# Patient Record
Sex: MALE | Race: WHITE | Employment: OTHER | URBAN - METROPOLITAN AREA
[De-identification: names, ages, dates, MRNs, and addresses within clinical notes are randomized per-mention and may not be internally consistent; named-entity substitution may affect disease eponyms.]

---

## 2019-01-01 ENCOUNTER — HOSPITAL ENCOUNTER (OUTPATIENT)
Age: 84
Discharge: HOME | End: 2019-10-10
Payer: MEDICARE

## 2019-01-01 LAB — PROSTATE SPECIFIC ANTIGEN: 0.03 NG/ML (ref 0–4)

## 2019-01-01 PROCEDURE — 36415 COLL VENOUS BLD VENIPUNCTURE: CPT

## 2019-01-01 PROCEDURE — 84153 ASSAY OF PSA TOTAL: CPT

## 2020-01-01 ENCOUNTER — TELEPHONE (OUTPATIENT)
Dept: SURGERY | Age: 85
End: 2020-01-01

## 2020-01-01 ENCOUNTER — APPOINTMENT (OUTPATIENT)
Dept: GENERAL RADIOLOGY | Age: 85
DRG: 023 | End: 2020-01-01
Payer: MEDICARE

## 2020-01-01 ENCOUNTER — OFFICE VISIT (OUTPATIENT)
Dept: SURGERY | Age: 85
End: 2020-01-01
Payer: MEDICARE

## 2020-01-01 ENCOUNTER — HOSPITAL ENCOUNTER (INPATIENT)
Age: 85
LOS: 7 days | Discharge: NURSING, REHAB OR LONG-TERM CARE | DRG: 023 | End: 2020-06-03
Attending: EMERGENCY MEDICINE | Admitting: INTERNAL MEDICINE
Payer: MEDICARE

## 2020-01-01 ENCOUNTER — APPOINTMENT (OUTPATIENT)
Dept: CT IMAGING | Age: 85
DRG: 023 | End: 2020-01-01
Payer: MEDICARE

## 2020-01-01 ENCOUNTER — APPOINTMENT (OUTPATIENT)
Dept: INTERVENTIONAL RADIOLOGY/VASCULAR | Age: 85
DRG: 023 | End: 2020-01-01
Payer: MEDICARE

## 2020-01-01 ENCOUNTER — ANESTHESIA (OUTPATIENT)
Dept: INTERVENTIONAL RADIOLOGY/VASCULAR | Age: 85
DRG: 023 | End: 2020-01-01
Payer: MEDICARE

## 2020-01-01 ENCOUNTER — ANESTHESIA EVENT (OUTPATIENT)
Dept: INTERVENTIONAL RADIOLOGY/VASCULAR | Age: 85
DRG: 023 | End: 2020-01-01
Payer: MEDICARE

## 2020-01-01 VITALS
OXYGEN SATURATION: 96 % | HEART RATE: 87 BPM | DIASTOLIC BLOOD PRESSURE: 78 MMHG | RESPIRATION RATE: 24 BRPM | BODY MASS INDEX: 28.34 KG/M2 | WEIGHT: 187 LBS | SYSTOLIC BLOOD PRESSURE: 133 MMHG | HEIGHT: 68 IN | TEMPERATURE: 96.9 F

## 2020-01-01 VITALS — SYSTOLIC BLOOD PRESSURE: 142 MMHG | OXYGEN SATURATION: 100 % | DIASTOLIC BLOOD PRESSURE: 71 MMHG

## 2020-01-01 VITALS
BODY MASS INDEX: 29.29 KG/M2 | OXYGEN SATURATION: 95 % | SYSTOLIC BLOOD PRESSURE: 116 MMHG | HEIGHT: 67 IN | HEART RATE: 80 BPM | RESPIRATION RATE: 18 BRPM | DIASTOLIC BLOOD PRESSURE: 72 MMHG | TEMPERATURE: 98.1 F

## 2020-01-01 LAB
ALBUMIN SERPL-MCNC: 2.9 G/DL (ref 3.5–5.2)
ALBUMIN SERPL-MCNC: 4.2 G/DL (ref 3.5–5.2)
ALP BLD-CCNC: 68 U/L (ref 40–129)
ALP BLD-CCNC: 70 U/L (ref 40–129)
ALT SERPL-CCNC: 10 U/L (ref 0–40)
ALT SERPL-CCNC: 31 U/L (ref 0–40)
ANION GAP SERPL CALCULATED.3IONS-SCNC: 10 MMOL/L (ref 7–16)
ANION GAP SERPL CALCULATED.3IONS-SCNC: 13 MMOL/L (ref 7–16)
ANION GAP SERPL CALCULATED.3IONS-SCNC: 14 MMOL/L (ref 7–16)
ANION GAP SERPL CALCULATED.3IONS-SCNC: 16 MMOL/L (ref 7–16)
ANION GAP SERPL CALCULATED.3IONS-SCNC: 16 MMOL/L (ref 7–16)
APTT: 25.9 SEC (ref 24.5–35.1)
AST SERPL-CCNC: 16 U/L (ref 0–39)
AST SERPL-CCNC: 34 U/L (ref 0–39)
BASOPHILS ABSOLUTE: 0.04 E9/L (ref 0–0.2)
BASOPHILS ABSOLUTE: 0.04 E9/L (ref 0–0.2)
BASOPHILS RELATIVE PERCENT: 0.4 % (ref 0–2)
BASOPHILS RELATIVE PERCENT: 0.7 % (ref 0–2)
BILIRUB SERPL-MCNC: 0.6 MG/DL (ref 0–1.2)
BILIRUB SERPL-MCNC: 0.6 MG/DL (ref 0–1.2)
BUN BLDV-MCNC: 20 MG/DL (ref 8–23)
BUN BLDV-MCNC: 23 MG/DL (ref 8–23)
BUN BLDV-MCNC: 25 MG/DL (ref 8–23)
BUN BLDV-MCNC: 26 MG/DL (ref 8–23)
BUN BLDV-MCNC: 31 MG/DL (ref 8–23)
CALCIUM SERPL-MCNC: 7.8 MG/DL (ref 8.6–10.2)
CALCIUM SERPL-MCNC: 8.4 MG/DL (ref 8.6–10.2)
CALCIUM SERPL-MCNC: 8.5 MG/DL (ref 8.6–10.2)
CALCIUM SERPL-MCNC: 8.6 MG/DL (ref 8.6–10.2)
CALCIUM SERPL-MCNC: 8.9 MG/DL (ref 8.6–10.2)
CHLORIDE BLD-SCNC: 103 MMOL/L (ref 98–107)
CHLORIDE BLD-SCNC: 105 MMOL/L (ref 98–107)
CHLORIDE BLD-SCNC: 105 MMOL/L (ref 98–107)
CHLORIDE BLD-SCNC: 108 MMOL/L (ref 98–107)
CHLORIDE BLD-SCNC: 109 MMOL/L (ref 98–107)
CHOLESTEROL, TOTAL: 103 MG/DL (ref 0–199)
CO2: 19 MMOL/L (ref 22–29)
CO2: 21 MMOL/L (ref 22–29)
CO2: 22 MMOL/L (ref 22–29)
CO2: 22 MMOL/L (ref 22–29)
CO2: 25 MMOL/L (ref 22–29)
CREAT SERPL-MCNC: 0.8 MG/DL (ref 0.7–1.2)
CREAT SERPL-MCNC: 1 MG/DL (ref 0.7–1.2)
CREAT SERPL-MCNC: 1.1 MG/DL (ref 0.7–1.2)
CREAT SERPL-MCNC: 1.2 MG/DL (ref 0.7–1.2)
CREAT SERPL-MCNC: 1.3 MG/DL (ref 0.7–1.2)
EKG ATRIAL RATE: 46 BPM
EKG P AXIS: 92 DEGREES
EKG P-R INTERVAL: 208 MS
EKG Q-T INTERVAL: 524 MS
EKG QRS DURATION: 88 MS
EKG QTC CALCULATION (BAZETT): 458 MS
EKG R AXIS: -30 DEGREES
EKG T AXIS: -9 DEGREES
EKG VENTRICULAR RATE: 46 BPM
EOSINOPHILS ABSOLUTE: 0.06 E9/L (ref 0.05–0.5)
EOSINOPHILS ABSOLUTE: 0.11 E9/L (ref 0.05–0.5)
EOSINOPHILS RELATIVE PERCENT: 1.1 % (ref 0–6)
EOSINOPHILS RELATIVE PERCENT: 1.1 % (ref 0–6)
GFR AFRICAN AMERICAN: >60
GFR NON-AFRICAN AMERICAN: 52 ML/MIN/1.73
GFR NON-AFRICAN AMERICAN: 57 ML/MIN/1.73
GFR NON-AFRICAN AMERICAN: >60 ML/MIN/1.73
GLUCOSE BLD-MCNC: 111 MG/DL (ref 74–99)
GLUCOSE BLD-MCNC: 112 MG/DL (ref 74–99)
GLUCOSE BLD-MCNC: 112 MG/DL (ref 74–99)
GLUCOSE BLD-MCNC: 125 MG/DL (ref 74–99)
GLUCOSE BLD-MCNC: 154 MG/DL (ref 74–99)
HBA1C MFR BLD: 5.7 % (ref 4–5.6)
HCT VFR BLD CALC: 25.4 % (ref 37–54)
HCT VFR BLD CALC: 25.6 % (ref 37–54)
HCT VFR BLD CALC: 25.7 % (ref 37–54)
HCT VFR BLD CALC: 26.4 % (ref 37–54)
HCT VFR BLD CALC: 28.1 % (ref 37–54)
HCT VFR BLD CALC: 35.3 % (ref 37–54)
HDLC SERPL-MCNC: 52 MG/DL
HEMOGLOBIN: 11.5 G/DL (ref 12.5–16.5)
HEMOGLOBIN: 8.2 G/DL (ref 12.5–16.5)
HEMOGLOBIN: 8.3 G/DL (ref 12.5–16.5)
HEMOGLOBIN: 8.4 G/DL (ref 12.5–16.5)
HEMOGLOBIN: 8.4 G/DL (ref 12.5–16.5)
HEMOGLOBIN: 9.3 G/DL (ref 12.5–16.5)
IMMATURE GRANULOCYTES #: 0.04 E9/L
IMMATURE GRANULOCYTES #: 0.13 E9/L
IMMATURE GRANULOCYTES %: 0.7 % (ref 0–5)
IMMATURE GRANULOCYTES %: 1.3 % (ref 0–5)
INR BLD: 1.2
LDL CHOLESTEROL CALCULATED: 37 MG/DL (ref 0–99)
LYMPHOCYTES ABSOLUTE: 0.68 E9/L (ref 1.5–4)
LYMPHOCYTES ABSOLUTE: 1 E9/L (ref 1.5–4)
LYMPHOCYTES RELATIVE PERCENT: 17.9 % (ref 20–42)
LYMPHOCYTES RELATIVE PERCENT: 6.9 % (ref 20–42)
MAGNESIUM: 2.2 MG/DL (ref 1.6–2.6)
MCH RBC QN AUTO: 29.7 PG (ref 26–35)
MCH RBC QN AUTO: 29.8 PG (ref 26–35)
MCH RBC QN AUTO: 30 PG (ref 26–35)
MCH RBC QN AUTO: 30 PG (ref 26–35)
MCH RBC QN AUTO: 30.1 PG (ref 26–35)
MCH RBC QN AUTO: 30.3 PG (ref 26–35)
MCHC RBC AUTO-ENTMCNC: 31.8 % (ref 32–34.5)
MCHC RBC AUTO-ENTMCNC: 32 % (ref 32–34.5)
MCHC RBC AUTO-ENTMCNC: 32.6 % (ref 32–34.5)
MCHC RBC AUTO-ENTMCNC: 32.7 % (ref 32–34.5)
MCHC RBC AUTO-ENTMCNC: 32.7 % (ref 32–34.5)
MCHC RBC AUTO-ENTMCNC: 33.1 % (ref 32–34.5)
MCV RBC AUTO: 90.6 FL (ref 80–99.9)
MCV RBC AUTO: 91.2 FL (ref 80–99.9)
MCV RBC AUTO: 91.7 FL (ref 80–99.9)
MCV RBC AUTO: 92.8 FL (ref 80–99.9)
MCV RBC AUTO: 93.1 FL (ref 80–99.9)
MCV RBC AUTO: 94.6 FL (ref 80–99.9)
METER GLUCOSE: 114 MG/DL (ref 74–99)
MONOCYTES ABSOLUTE: 0.55 E9/L (ref 0.1–0.95)
MONOCYTES ABSOLUTE: 0.93 E9/L (ref 0.1–0.95)
MONOCYTES RELATIVE PERCENT: 9.4 % (ref 2–12)
MONOCYTES RELATIVE PERCENT: 9.8 % (ref 2–12)
MRSA CULTURE ONLY: NORMAL
NEUTROPHILS ABSOLUTE: 3.91 E9/L (ref 1.8–7.3)
NEUTROPHILS ABSOLUTE: 8 E9/L (ref 1.8–7.3)
NEUTROPHILS RELATIVE PERCENT: 69.8 % (ref 43–80)
NEUTROPHILS RELATIVE PERCENT: 80.9 % (ref 43–80)
PDW BLD-RTO: 13.3 FL (ref 11.5–15)
PDW BLD-RTO: 13.4 FL (ref 11.5–15)
PDW BLD-RTO: 13.4 FL (ref 11.5–15)
PDW BLD-RTO: 13.5 FL (ref 11.5–15)
PDW BLD-RTO: 13.6 FL (ref 11.5–15)
PDW BLD-RTO: 13.7 FL (ref 11.5–15)
PHOSPHORUS: 3.3 MG/DL (ref 2.5–4.5)
PLATELET # BLD: 129 E9/L (ref 130–450)
PLATELET # BLD: 137 E9/L (ref 130–450)
PLATELET # BLD: 139 E9/L (ref 130–450)
PLATELET # BLD: 182 E9/L (ref 130–450)
PLATELET # BLD: 187 E9/L (ref 130–450)
PLATELET # BLD: 202 E9/L (ref 130–450)
PMV BLD AUTO: 9.3 FL (ref 7–12)
PMV BLD AUTO: 9.4 FL (ref 7–12)
PMV BLD AUTO: 9.7 FL (ref 7–12)
PMV BLD AUTO: 9.8 FL (ref 7–12)
POTASSIUM SERPL-SCNC: 3.7 MMOL/L (ref 3.5–5)
POTASSIUM SERPL-SCNC: 3.8 MMOL/L (ref 3.5–5)
POTASSIUM SERPL-SCNC: 4 MMOL/L (ref 3.5–5)
POTASSIUM SERPL-SCNC: 4.1 MMOL/L (ref 3.5–5)
POTASSIUM SERPL-SCNC: 4.5 MMOL/L (ref 3.5–5)
PROTHROMBIN TIME: 13.4 SEC (ref 9.3–12.4)
RBC # BLD: 2.75 E12/L (ref 3.8–5.8)
RBC # BLD: 2.77 E12/L (ref 3.8–5.8)
RBC # BLD: 2.77 E12/L (ref 3.8–5.8)
RBC # BLD: 2.79 E12/L (ref 3.8–5.8)
RBC # BLD: 3.1 E12/L (ref 3.8–5.8)
RBC # BLD: 3.87 E12/L (ref 3.8–5.8)
SARS-COV-2, NAAT: ABNORMAL
SARS-COV-2, NAAT: NOT DETECTED
SODIUM BLD-SCNC: 140 MMOL/L (ref 132–146)
SODIUM BLD-SCNC: 140 MMOL/L (ref 132–146)
SODIUM BLD-SCNC: 141 MMOL/L (ref 132–146)
SODIUM BLD-SCNC: 141 MMOL/L (ref 132–146)
SODIUM BLD-SCNC: 146 MMOL/L (ref 132–146)
TOTAL PROTEIN: 5.4 G/DL (ref 6.4–8.3)
TOTAL PROTEIN: 6.8 G/DL (ref 6.4–8.3)
TRIGL SERPL-MCNC: 69 MG/DL (ref 0–149)
TROPONIN: <0.01 NG/ML (ref 0–0.03)
TSH SERPL DL<=0.05 MIU/L-ACNC: 4.87 UIU/ML (ref 0.27–4.2)
VLDLC SERPL CALC-MCNC: 14 MG/DL
WBC # BLD: 10.2 E9/L (ref 4.5–11.5)
WBC # BLD: 10.8 E9/L (ref 4.5–11.5)
WBC # BLD: 12.2 E9/L (ref 4.5–11.5)
WBC # BLD: 5.6 E9/L (ref 4.5–11.5)
WBC # BLD: 8.6 E9/L (ref 4.5–11.5)
WBC # BLD: 9.9 E9/L (ref 4.5–11.5)

## 2020-01-01 PROCEDURE — 6370000000 HC RX 637 (ALT 250 FOR IP): Performed by: NURSE PRACTITIONER

## 2020-01-01 PROCEDURE — 36415 COLL VENOUS BLD VENIPUNCTURE: CPT

## 2020-01-01 PROCEDURE — 85025 COMPLETE CBC W/AUTO DIFF WBC: CPT

## 2020-01-01 PROCEDURE — 99291 CRITICAL CARE FIRST HOUR: CPT | Performed by: SURGERY

## 2020-01-01 PROCEDURE — 97112 NEUROMUSCULAR REEDUCATION: CPT

## 2020-01-01 PROCEDURE — 70450 CT HEAD/BRAIN W/O DYE: CPT

## 2020-01-01 PROCEDURE — 2060000000 HC ICU INTERMEDIATE R&B

## 2020-01-01 PROCEDURE — U0002 COVID-19 LAB TEST NON-CDC: HCPCS

## 2020-01-01 PROCEDURE — 2580000003 HC RX 258: Performed by: NURSE PRACTITIONER

## 2020-01-01 PROCEDURE — APPSS60 APP SPLIT SHARED TIME 46-60 MINUTES: Performed by: NURSE PRACTITIONER

## 2020-01-01 PROCEDURE — 61645 PERQ ART M-THROMBECT &/NFS: CPT

## 2020-01-01 PROCEDURE — 83735 ASSAY OF MAGNESIUM: CPT

## 2020-01-01 PROCEDURE — 6360000004 HC RX CONTRAST MEDICATION: Performed by: RADIOLOGY

## 2020-01-01 PROCEDURE — 74018 RADEX ABDOMEN 1 VIEW: CPT

## 2020-01-01 PROCEDURE — 3E03317 INTRODUCTION OF OTHER THROMBOLYTIC INTO PERIPHERAL VEIN, PERCUTANEOUS APPROACH: ICD-10-PCS | Performed by: EMERGENCY MEDICINE

## 2020-01-01 PROCEDURE — 80061 LIPID PANEL: CPT

## 2020-01-01 PROCEDURE — 99231 SBSQ HOSP IP/OBS SF/LOW 25: CPT | Performed by: INTERNAL MEDICINE

## 2020-01-01 PROCEDURE — 3700000001 HC ADD 15 MINUTES (ANESTHESIA)

## 2020-01-01 PROCEDURE — APPSS180 APP SPLIT SHARED TIME > 60 MINUTES: Performed by: NURSE PRACTITIONER

## 2020-01-01 PROCEDURE — 6370000000 HC RX 637 (ALT 250 FOR IP): Performed by: STUDENT IN AN ORGANIZED HEALTH CARE EDUCATION/TRAINING PROGRAM

## 2020-01-01 PROCEDURE — 6370000000 HC RX 637 (ALT 250 FOR IP): Performed by: INTERNAL MEDICINE

## 2020-01-01 PROCEDURE — 80053 COMPREHEN METABOLIC PANEL: CPT

## 2020-01-01 PROCEDURE — 85027 COMPLETE CBC AUTOMATED: CPT

## 2020-01-01 PROCEDURE — 99291 CRITICAL CARE FIRST HOUR: CPT

## 2020-01-01 PROCEDURE — 2580000003 HC RX 258: Performed by: NURSE ANESTHETIST, CERTIFIED REGISTERED

## 2020-01-01 PROCEDURE — 2000000000 HC ICU R&B

## 2020-01-01 PROCEDURE — 2500000003 HC RX 250 WO HCPCS: Performed by: SURGERY

## 2020-01-01 PROCEDURE — 93308 TTE F-UP OR LMTD: CPT

## 2020-01-01 PROCEDURE — 84100 ASSAY OF PHOSPHORUS: CPT

## 2020-01-01 PROCEDURE — 6360000002 HC RX W HCPCS: Performed by: EMERGENCY MEDICINE

## 2020-01-01 PROCEDURE — 5A1935Z RESPIRATORY VENTILATION, LESS THAN 24 CONSECUTIVE HOURS: ICD-10-PCS | Performed by: EMERGENCY MEDICINE

## 2020-01-01 PROCEDURE — 2700000000 HC OXYGEN THERAPY PER DAY

## 2020-01-01 PROCEDURE — 85610 PROTHROMBIN TIME: CPT

## 2020-01-01 PROCEDURE — 2500000003 HC RX 250 WO HCPCS: Performed by: NURSE ANESTHETIST, CERTIFIED REGISTERED

## 2020-01-01 PROCEDURE — 97530 THERAPEUTIC ACTIVITIES: CPT

## 2020-01-01 PROCEDURE — 85730 THROMBOPLASTIN TIME PARTIAL: CPT

## 2020-01-01 PROCEDURE — 80048 BASIC METABOLIC PNL TOTAL CA: CPT

## 2020-01-01 PROCEDURE — 83036 HEMOGLOBIN GLYCOSYLATED A1C: CPT

## 2020-01-01 PROCEDURE — 51702 INSERT TEMP BLADDER CATH: CPT

## 2020-01-01 PROCEDURE — 82962 GLUCOSE BLOOD TEST: CPT

## 2020-01-01 PROCEDURE — 03HY32Z INSERTION OF MONITORING DEVICE INTO UPPER ARTERY, PERCUTANEOUS APPROACH: ICD-10-PCS | Performed by: NURSE ANESTHETIST, CERTIFIED REGISTERED

## 2020-01-01 PROCEDURE — C1769 GUIDE WIRE: HCPCS

## 2020-01-01 PROCEDURE — 2580000003 HC RX 258: Performed by: SURGERY

## 2020-01-01 PROCEDURE — 97530 THERAPEUTIC ACTIVITIES: CPT | Performed by: PHYSICAL THERAPIST

## 2020-01-01 PROCEDURE — 84484 ASSAY OF TROPONIN QUANT: CPT

## 2020-01-01 PROCEDURE — 6370000000 HC RX 637 (ALT 250 FOR IP): Performed by: SURGERY

## 2020-01-01 PROCEDURE — 99233 SBSQ HOSP IP/OBS HIGH 50: CPT | Performed by: SURGERY

## 2020-01-01 PROCEDURE — 97166 OT EVAL MOD COMPLEX 45 MIN: CPT

## 2020-01-01 PROCEDURE — 97110 THERAPEUTIC EXERCISES: CPT

## 2020-01-01 PROCEDURE — 97110 THERAPEUTIC EXERCISES: CPT | Performed by: PHYSICAL THERAPIST

## 2020-01-01 PROCEDURE — 03CG3ZZ EXTIRPATION OF MATTER FROM INTRACRANIAL ARTERY, PERCUTANEOUS APPROACH: ICD-10-PCS | Performed by: RADIOLOGY

## 2020-01-01 PROCEDURE — 97162 PT EVAL MOD COMPLEX 30 MIN: CPT

## 2020-01-01 PROCEDURE — 93005 ELECTROCARDIOGRAM TRACING: CPT | Performed by: SURGERY

## 2020-01-01 PROCEDURE — 6370000000 HC RX 637 (ALT 250 FOR IP): Performed by: CLINICAL NURSE SPECIALIST

## 2020-01-01 PROCEDURE — 0042T CT BRAIN PERFUSION: CPT

## 2020-01-01 PROCEDURE — 2580000003 HC RX 258: Performed by: EMERGENCY MEDICINE

## 2020-01-01 PROCEDURE — 94002 VENT MGMT INPAT INIT DAY: CPT

## 2020-01-01 PROCEDURE — B30R1ZZ PLAIN RADIOGRAPHY OF INTRACRANIAL ARTERIES USING LOW OSMOLAR CONTRAST: ICD-10-PCS | Performed by: RADIOLOGY

## 2020-01-01 PROCEDURE — 70498 CT ANGIOGRAPHY NECK: CPT

## 2020-01-01 PROCEDURE — 87081 CULTURE SCREEN ONLY: CPT

## 2020-01-01 PROCEDURE — 93010 ELECTROCARDIOGRAM REPORT: CPT | Performed by: INTERNAL MEDICINE

## 2020-01-01 PROCEDURE — 70496 CT ANGIOGRAPHY HEAD: CPT

## 2020-01-01 PROCEDURE — 3700000000 HC ANESTHESIA ATTENDED CARE

## 2020-01-01 PROCEDURE — 36620 INSERTION CATHETER ARTERY: CPT

## 2020-01-01 PROCEDURE — 97129 THER IVNTJ 1ST 15 MIN: CPT

## 2020-01-01 PROCEDURE — 92523 SPEECH SOUND LANG COMPREHEN: CPT

## 2020-01-01 PROCEDURE — 6360000002 HC RX W HCPCS: Performed by: SURGERY

## 2020-01-01 PROCEDURE — 6360000002 HC RX W HCPCS: Performed by: NURSE ANESTHETIST, CERTIFIED REGISTERED

## 2020-01-01 PROCEDURE — 99213 OFFICE O/P EST LOW 20 MIN: CPT | Performed by: SURGERY

## 2020-01-01 PROCEDURE — 99232 SBSQ HOSP IP/OBS MODERATE 35: CPT | Performed by: CLINICAL NURSE SPECIALIST

## 2020-01-01 PROCEDURE — 99233 SBSQ HOSP IP/OBS HIGH 50: CPT | Performed by: CLINICAL NURSE SPECIALIST

## 2020-01-01 PROCEDURE — 99291 CRITICAL CARE FIRST HOUR: CPT | Performed by: PSYCHIATRY & NEUROLOGY

## 2020-01-01 PROCEDURE — 84443 ASSAY THYROID STIM HORMONE: CPT

## 2020-01-01 PROCEDURE — 51798 US URINE CAPACITY MEASURE: CPT

## 2020-01-01 PROCEDURE — 6360000002 HC RX W HCPCS: Performed by: RADIOLOGY

## 2020-01-01 PROCEDURE — APPSS60 APP SPLIT SHARED TIME 46-60 MINUTES: Performed by: PHYSICIAN ASSISTANT

## 2020-01-01 PROCEDURE — 97535 SELF CARE MNGMENT TRAINING: CPT

## 2020-01-01 PROCEDURE — 6360000002 HC RX W HCPCS

## 2020-01-01 PROCEDURE — 99223 1ST HOSP IP/OBS HIGH 75: CPT | Performed by: INTERNAL MEDICINE

## 2020-01-01 PROCEDURE — 71045 X-RAY EXAM CHEST 1 VIEW: CPT

## 2020-01-01 RX ORDER — ATENOLOL 50 MG/1
100 TABLET ORAL DAILY
Status: DISCONTINUED | OUTPATIENT
Start: 2020-01-01 | End: 2020-01-01

## 2020-01-01 RX ORDER — POLYETHYLENE GLYCOL 3350 17 G/17G
17 POWDER, FOR SOLUTION ORAL DAILY PRN
Status: DISCONTINUED | OUTPATIENT
Start: 2020-01-01 | End: 2020-01-01 | Stop reason: HOSPADM

## 2020-01-01 RX ORDER — MORPHINE SULFATE 2 MG/ML
1 INJECTION, SOLUTION INTRAMUSCULAR; INTRAVENOUS EVERY 5 MIN PRN
Status: DISCONTINUED | OUTPATIENT
Start: 2020-01-01 | End: 2020-01-01

## 2020-01-01 RX ORDER — SUCCINYLCHOLINE CHLORIDE 20 MG/ML
INJECTION INTRAMUSCULAR; INTRAVENOUS PRN
Status: DISCONTINUED | OUTPATIENT
Start: 2020-01-01 | End: 2020-01-01 | Stop reason: SDUPTHER

## 2020-01-01 RX ORDER — HEPARIN SODIUM 10000 [USP'U]/ML
INJECTION, SOLUTION INTRAVENOUS; SUBCUTANEOUS
Status: COMPLETED | OUTPATIENT
Start: 2020-01-01 | End: 2020-01-01

## 2020-01-01 RX ORDER — LABETALOL HYDROCHLORIDE 5 MG/ML
10 INJECTION, SOLUTION INTRAVENOUS EVERY 30 MIN PRN
Status: DISCONTINUED | OUTPATIENT
Start: 2020-01-01 | End: 2020-01-01

## 2020-01-01 RX ORDER — SODIUM CHLORIDE 0.9 % (FLUSH) 0.9 %
10 SYRINGE (ML) INJECTION PRN
Status: DISCONTINUED | OUTPATIENT
Start: 2020-01-01 | End: 2020-01-01 | Stop reason: HOSPADM

## 2020-01-01 RX ORDER — ATORVASTATIN CALCIUM 80 MG/1
80 TABLET, FILM COATED ORAL NIGHTLY
Qty: 30 TABLET | Refills: 3 | Status: SHIPPED | OUTPATIENT
Start: 2020-01-01

## 2020-01-01 RX ORDER — ASPIRIN 81 MG/1
81 TABLET, CHEWABLE ORAL DAILY
Status: DISCONTINUED | OUTPATIENT
Start: 2020-01-01 | End: 2020-01-01 | Stop reason: SDUPTHER

## 2020-01-01 RX ORDER — ASPIRIN 81 MG/1
81 TABLET ORAL DAILY
Status: DISCONTINUED | OUTPATIENT
Start: 2020-01-01 | End: 2020-01-01

## 2020-01-01 RX ORDER — SODIUM CHLORIDE, SODIUM LACTATE, POTASSIUM CHLORIDE, CALCIUM CHLORIDE 600; 310; 30; 20 MG/100ML; MG/100ML; MG/100ML; MG/100ML
INJECTION, SOLUTION INTRAVENOUS CONTINUOUS PRN
Status: DISCONTINUED | OUTPATIENT
Start: 2020-01-01 | End: 2020-01-01 | Stop reason: SDUPTHER

## 2020-01-01 RX ORDER — ONDANSETRON 2 MG/ML
4 INJECTION INTRAMUSCULAR; INTRAVENOUS
Status: ACTIVE | OUTPATIENT
Start: 2020-01-01 | End: 2020-01-01

## 2020-01-01 RX ORDER — ACETAMINOPHEN 325 MG/1
650 TABLET ORAL EVERY 4 HOURS PRN
Status: DISCONTINUED | OUTPATIENT
Start: 2020-01-01 | End: 2020-01-01 | Stop reason: HOSPADM

## 2020-01-01 RX ORDER — SODIUM CHLORIDE 9 MG/ML
INJECTION, SOLUTION INTRAVENOUS CONTINUOUS
Status: DISCONTINUED | OUTPATIENT
Start: 2020-01-01 | End: 2020-01-01 | Stop reason: HOSPADM

## 2020-01-01 RX ORDER — SODIUM CHLORIDE 0.9 % (FLUSH) 0.9 %
10 SYRINGE (ML) INJECTION PRN
Status: DISCONTINUED | OUTPATIENT
Start: 2020-01-01 | End: 2020-01-01

## 2020-01-01 RX ORDER — NIFEDIPINE 30 MG/1
30 TABLET, EXTENDED RELEASE ORAL DAILY
Status: DISCONTINUED | OUTPATIENT
Start: 2020-01-01 | End: 2020-01-01 | Stop reason: HOSPADM

## 2020-01-01 RX ORDER — GLYCOPYRROLATE 1 MG/5 ML
SYRINGE (ML) INTRAVENOUS PRN
Status: DISCONTINUED | OUTPATIENT
Start: 2020-01-01 | End: 2020-01-01 | Stop reason: SDUPTHER

## 2020-01-01 RX ORDER — CALCIUM CHLORIDE 100 MG/ML
INJECTION INTRAVENOUS; INTRAVENTRICULAR PRN
Status: DISCONTINUED | OUTPATIENT
Start: 2020-01-01 | End: 2020-01-01 | Stop reason: SDUPTHER

## 2020-01-01 RX ORDER — ASPIRIN 300 MG/1
300 SUPPOSITORY RECTAL DAILY
Status: DISCONTINUED | OUTPATIENT
Start: 2020-01-01 | End: 2020-01-01

## 2020-01-01 RX ORDER — MORPHINE SULFATE 2 MG/ML
2 INJECTION, SOLUTION INTRAMUSCULAR; INTRAVENOUS EVERY 5 MIN PRN
Status: DISCONTINUED | OUTPATIENT
Start: 2020-01-01 | End: 2020-01-01

## 2020-01-01 RX ORDER — AMLODIPINE BESYLATE 2.5 MG/1
2.5 TABLET ORAL DAILY
COMMUNITY

## 2020-01-01 RX ORDER — PROMETHAZINE HYDROCHLORIDE 25 MG/1
12.5 TABLET ORAL EVERY 6 HOURS PRN
Status: DISCONTINUED | OUTPATIENT
Start: 2020-01-01 | End: 2020-01-01

## 2020-01-01 RX ORDER — ETOMIDATE 2 MG/ML
INJECTION INTRAVENOUS PRN
Status: DISCONTINUED | OUTPATIENT
Start: 2020-01-01 | End: 2020-01-01 | Stop reason: SDUPTHER

## 2020-01-01 RX ORDER — DEXTROSE MONOHYDRATE 25 G/50ML
12.5 INJECTION, SOLUTION INTRAVENOUS
Status: ACTIVE | OUTPATIENT
Start: 2020-01-01 | End: 2020-01-01

## 2020-01-01 RX ORDER — ASPIRIN 81 MG/1
81 TABLET ORAL DAILY
COMMUNITY

## 2020-01-01 RX ORDER — ATORVASTATIN CALCIUM 80 MG/1
80 TABLET, FILM COATED ORAL NIGHTLY
Status: DISCONTINUED | OUTPATIENT
Start: 2020-01-01 | End: 2020-01-01 | Stop reason: HOSPADM

## 2020-01-01 RX ORDER — ONDANSETRON 2 MG/ML
4 INJECTION INTRAMUSCULAR; INTRAVENOUS EVERY 6 HOURS PRN
Status: DISCONTINUED | OUTPATIENT
Start: 2020-01-01 | End: 2020-01-01 | Stop reason: HOSPADM

## 2020-01-01 RX ORDER — CHLORTHALIDONE 25 MG/1
25 TABLET ORAL DAILY
Status: DISCONTINUED | OUTPATIENT
Start: 2020-01-01 | End: 2020-01-01 | Stop reason: HOSPADM

## 2020-01-01 RX ORDER — 0.9 % SODIUM CHLORIDE 0.9 %
50 INTRAVENOUS SOLUTION INTRAVENOUS ONCE
Status: DISCONTINUED | OUTPATIENT
Start: 2020-01-01 | End: 2020-01-01

## 2020-01-01 RX ORDER — ATENOLOL AND CHLORTHALIDONE TABLET 100; 25 MG/1; MG/1
1 TABLET ORAL DAILY
Status: DISCONTINUED | OUTPATIENT
Start: 2020-01-01 | End: 2020-01-01 | Stop reason: SDUPTHER

## 2020-01-01 RX ORDER — ATENOLOL AND CHLORTHALIDONE TABLET 100; 25 MG/1; MG/1
1 TABLET ORAL DAILY
COMMUNITY

## 2020-01-01 RX ORDER — HYDRALAZINE HYDROCHLORIDE 20 MG/ML
10 INJECTION INTRAMUSCULAR; INTRAVENOUS EVERY 4 HOURS PRN
Status: CANCELLED | OUTPATIENT
Start: 2020-01-01

## 2020-01-01 RX ORDER — DOXAZOSIN MESYLATE 4 MG/1
8 TABLET ORAL DAILY
Status: DISCONTINUED | OUTPATIENT
Start: 2020-01-01 | End: 2020-01-01 | Stop reason: HOSPADM

## 2020-01-01 RX ORDER — LABETALOL HYDROCHLORIDE 5 MG/ML
10 INJECTION, SOLUTION INTRAVENOUS EVERY 4 HOURS PRN
Status: CANCELLED | OUTPATIENT
Start: 2020-01-01

## 2020-01-01 RX ORDER — LEVOFLOXACIN 500 MG/1
500 TABLET, FILM COATED ORAL DAILY
COMMUNITY

## 2020-01-01 RX ORDER — VECURONIUM BROMIDE 1 MG/ML
INJECTION, POWDER, LYOPHILIZED, FOR SOLUTION INTRAVENOUS PRN
Status: DISCONTINUED | OUTPATIENT
Start: 2020-01-01 | End: 2020-01-01 | Stop reason: SDUPTHER

## 2020-01-01 RX ORDER — FENTANYL CITRATE 50 UG/ML
INJECTION, SOLUTION INTRAMUSCULAR; INTRAVENOUS PRN
Status: DISCONTINUED | OUTPATIENT
Start: 2020-01-01 | End: 2020-01-01 | Stop reason: SDUPTHER

## 2020-01-01 RX ORDER — SODIUM CHLORIDE 0.9 % (FLUSH) 0.9 %
10 SYRINGE (ML) INJECTION EVERY 12 HOURS SCHEDULED
Status: DISCONTINUED | OUTPATIENT
Start: 2020-01-01 | End: 2020-01-01

## 2020-01-01 RX ORDER — SODIUM CHLORIDE 0.9 % (FLUSH) 0.9 %
10 SYRINGE (ML) INJECTION ONCE
Status: DISCONTINUED | OUTPATIENT
Start: 2020-01-01 | End: 2020-01-01

## 2020-01-01 RX ORDER — TERAZOSIN 10 MG/1
10 CAPSULE ORAL NIGHTLY
COMMUNITY

## 2020-01-01 RX ORDER — ASPIRIN 81 MG/1
81 TABLET, CHEWABLE ORAL DAILY
Status: DISCONTINUED | OUTPATIENT
Start: 2020-01-01 | End: 2020-01-01

## 2020-01-01 RX ORDER — ONDANSETRON 2 MG/ML
4 INJECTION INTRAMUSCULAR; INTRAVENOUS EVERY 6 HOURS PRN
Status: DISCONTINUED | OUTPATIENT
Start: 2020-01-01 | End: 2020-01-01

## 2020-01-01 RX ORDER — GABAPENTIN 300 MG/1
300 CAPSULE ORAL DAILY
Status: DISCONTINUED | OUTPATIENT
Start: 2020-01-01 | End: 2020-01-01 | Stop reason: HOSPADM

## 2020-01-01 RX ORDER — SODIUM CHLORIDE 0.9 % (FLUSH) 0.9 %
10 SYRINGE (ML) INJECTION EVERY 12 HOURS SCHEDULED
Status: DISCONTINUED | OUTPATIENT
Start: 2020-01-01 | End: 2020-01-01 | Stop reason: HOSPADM

## 2020-01-01 RX ORDER — HYDRALAZINE HYDROCHLORIDE 20 MG/ML
10 INJECTION INTRAMUSCULAR; INTRAVENOUS EVERY 30 MIN PRN
Status: DISCONTINUED | OUTPATIENT
Start: 2020-01-01 | End: 2020-01-01

## 2020-01-01 RX ORDER — PROPOFOL 10 MG/ML
INJECTION, EMULSION INTRAVENOUS CONTINUOUS PRN
Status: DISCONTINUED | OUTPATIENT
Start: 2020-01-01 | End: 2020-01-01 | Stop reason: SDUPTHER

## 2020-01-01 RX ORDER — LEVOTHYROXINE SODIUM 0.05 MG/1
50 TABLET ORAL DAILY
Status: DISCONTINUED | OUTPATIENT
Start: 2020-01-01 | End: 2020-01-01 | Stop reason: HOSPADM

## 2020-01-01 RX ORDER — SENNA AND DOCUSATE SODIUM 50; 8.6 MG/1; MG/1
2 TABLET, FILM COATED ORAL DAILY PRN
Status: DISCONTINUED | OUTPATIENT
Start: 2020-01-01 | End: 2020-01-01 | Stop reason: HOSPADM

## 2020-01-01 RX ADMIN — FAMOTIDINE 20 MG: 10 INJECTION INTRAVENOUS at 09:00

## 2020-01-01 RX ADMIN — ASPIRIN 81 MG 81 MG: 81 TABLET ORAL at 08:48

## 2020-01-01 RX ADMIN — SODIUM CHLORIDE: 9 INJECTION, SOLUTION INTRAVENOUS at 20:15

## 2020-01-01 RX ADMIN — ATORVASTATIN CALCIUM 80 MG: 80 TABLET, FILM COATED ORAL at 20:15

## 2020-01-01 RX ADMIN — SODIUM CHLORIDE, PRESERVATIVE FREE 10 ML: 5 INJECTION INTRAVENOUS at 22:54

## 2020-01-01 RX ADMIN — ATORVASTATIN CALCIUM 80 MG: 80 TABLET, FILM COATED ORAL at 21:42

## 2020-01-01 RX ADMIN — ACETAMINOPHEN 650 MG: 325 TABLET ORAL at 17:45

## 2020-01-01 RX ADMIN — CALCIUM CHLORIDE 1 G: 100 INJECTION INTRAVENOUS; INTRAVENTRICULAR at 16:46

## 2020-01-01 RX ADMIN — VECURONIUM BROMIDE FOR INJECTION 6 MG: 1 INJECTION, POWDER, LYOPHILIZED, FOR SOLUTION INTRAVENOUS at 16:11

## 2020-01-01 RX ADMIN — METOPROLOL TARTRATE 25 MG: 25 TABLET, FILM COATED ORAL at 20:25

## 2020-01-01 RX ADMIN — Medication 5000 UNITS: at 18:17

## 2020-01-01 RX ADMIN — Medication 0.2 MG: at 16:30

## 2020-01-01 RX ADMIN — LEVOTHYROXINE SODIUM 50 MCG: 0.05 TABLET ORAL at 12:13

## 2020-01-01 RX ADMIN — LEVOTHYROXINE SODIUM 50 MCG: 0.05 TABLET ORAL at 06:14

## 2020-01-01 RX ADMIN — SODIUM CHLORIDE, PRESERVATIVE FREE 10 ML: 5 INJECTION INTRAVENOUS at 21:05

## 2020-01-01 RX ADMIN — ACETAMINOPHEN 650 MG: 325 TABLET ORAL at 12:57

## 2020-01-01 RX ADMIN — APIXABAN 5 MG: 5 TABLET, FILM COATED ORAL at 22:54

## 2020-01-01 RX ADMIN — NEOSTIGMINE METHYLSULFATE 3 MG: 0.5 INJECTION INTRAMUSCULAR; INTRAVENOUS; SUBCUTANEOUS at 18:45

## 2020-01-01 RX ADMIN — ALTEPLASE 73.5 MG: KIT at 15:04

## 2020-01-01 RX ADMIN — ASPIRIN 81 MG 81 MG: 81 TABLET ORAL at 08:37

## 2020-01-01 RX ADMIN — ACETAMINOPHEN 650 MG: 325 TABLET ORAL at 00:53

## 2020-01-01 RX ADMIN — VECURONIUM BROMIDE FOR INJECTION 1 MG: 1 INJECTION, POWDER, LYOPHILIZED, FOR SOLUTION INTRAVENOUS at 17:36

## 2020-01-01 RX ADMIN — ACETAMINOPHEN 650 MG: 325 TABLET ORAL at 23:03

## 2020-01-01 RX ADMIN — METOPROLOL TARTRATE 25 MG: 25 TABLET, FILM COATED ORAL at 21:05

## 2020-01-01 RX ADMIN — GABAPENTIN 300 MG: 300 CAPSULE ORAL at 08:37

## 2020-01-01 RX ADMIN — SODIUM CHLORIDE, PRESERVATIVE FREE 10 ML: 5 INJECTION INTRAVENOUS at 20:23

## 2020-01-01 RX ADMIN — ACETAMINOPHEN 650 MG: 325 TABLET ORAL at 05:56

## 2020-01-01 RX ADMIN — VECURONIUM BROMIDE FOR INJECTION 2 MG: 1 INJECTION, POWDER, LYOPHILIZED, FOR SOLUTION INTRAVENOUS at 16:57

## 2020-01-01 RX ADMIN — Medication 0.2 MG: at 17:44

## 2020-01-01 RX ADMIN — GABAPENTIN 300 MG: 300 CAPSULE ORAL at 13:22

## 2020-01-01 RX ADMIN — GABAPENTIN 300 MG: 300 CAPSULE ORAL at 09:13

## 2020-01-01 RX ADMIN — LEVOTHYROXINE SODIUM 50 MCG: 0.05 TABLET ORAL at 05:37

## 2020-01-01 RX ADMIN — ATORVASTATIN CALCIUM 80 MG: 80 TABLET, FILM COATED ORAL at 21:05

## 2020-01-01 RX ADMIN — LABETALOL HYDROCHLORIDE 10 MG: 5 INJECTION INTRAVENOUS at 20:21

## 2020-01-01 RX ADMIN — SODIUM CHLORIDE: 9 INJECTION, SOLUTION INTRAVENOUS at 20:25

## 2020-01-01 RX ADMIN — ATORVASTATIN CALCIUM 80 MG: 80 TABLET, FILM COATED ORAL at 20:25

## 2020-01-01 RX ADMIN — GABAPENTIN 300 MG: 300 CAPSULE ORAL at 09:06

## 2020-01-01 RX ADMIN — SODIUM CHLORIDE, PRESERVATIVE FREE 10 ML: 5 INJECTION INTRAVENOUS at 09:00

## 2020-01-01 RX ADMIN — SODIUM CHLORIDE: 9 INJECTION, SOLUTION INTRAVENOUS at 10:44

## 2020-01-01 RX ADMIN — FENTANYL CITRATE 100 MCG: 50 INJECTION, SOLUTION INTRAMUSCULAR; INTRAVENOUS at 16:05

## 2020-01-01 RX ADMIN — DOXAZOSIN 8 MG: 4 TABLET ORAL at 12:12

## 2020-01-01 RX ADMIN — ASPIRIN 81 MG 81 MG: 81 TABLET ORAL at 09:06

## 2020-01-01 RX ADMIN — METOPROLOL TARTRATE 25 MG: 25 TABLET, FILM COATED ORAL at 09:09

## 2020-01-01 RX ADMIN — Medication 0.2 MG: at 16:17

## 2020-01-01 RX ADMIN — SODIUM CHLORIDE: 9 INJECTION, SOLUTION INTRAVENOUS at 06:14

## 2020-01-01 RX ADMIN — PROPOFOL 40 MCG/KG/MIN: 10 INJECTION, EMULSION INTRAVENOUS at 18:20

## 2020-01-01 RX ADMIN — Medication 5000 UNITS: at 18:16

## 2020-01-01 RX ADMIN — ACETAMINOPHEN 650 MG: 325 TABLET ORAL at 17:27

## 2020-01-01 RX ADMIN — SODIUM CHLORIDE, PRESERVATIVE FREE 10 ML: 5 INJECTION INTRAVENOUS at 21:00

## 2020-01-01 RX ADMIN — LEVOTHYROXINE SODIUM 50 MCG: 0.05 TABLET ORAL at 05:20

## 2020-01-01 RX ADMIN — ATORVASTATIN CALCIUM 80 MG: 80 TABLET, FILM COATED ORAL at 20:59

## 2020-01-01 RX ADMIN — LEVOTHYROXINE SODIUM 50 MCG: 0.05 TABLET ORAL at 06:39

## 2020-01-01 RX ADMIN — ACETAMINOPHEN 650 MG: 325 TABLET ORAL at 18:52

## 2020-01-01 RX ADMIN — HYDRALAZINE HYDROCHLORIDE 10 MG: 20 INJECTION INTRAMUSCULAR; INTRAVENOUS at 00:17

## 2020-01-01 RX ADMIN — SODIUM CHLORIDE, PRESERVATIVE FREE 10 ML: 5 INJECTION INTRAVENOUS at 08:38

## 2020-01-01 RX ADMIN — SODIUM CHLORIDE, POTASSIUM CHLORIDE, SODIUM LACTATE AND CALCIUM CHLORIDE: 600; 310; 30; 20 INJECTION, SOLUTION INTRAVENOUS at 15:56

## 2020-01-01 RX ADMIN — DOXAZOSIN 8 MG: 4 TABLET ORAL at 21:22

## 2020-01-01 RX ADMIN — ATORVASTATIN CALCIUM 80 MG: 80 TABLET, FILM COATED ORAL at 22:55

## 2020-01-01 RX ADMIN — GABAPENTIN 300 MG: 300 CAPSULE ORAL at 08:36

## 2020-01-01 RX ADMIN — FAMOTIDINE 20 MG: 10 INJECTION INTRAVENOUS at 21:37

## 2020-01-01 RX ADMIN — IOVERSOL 90 ML: 678 INJECTION INTRA-ARTERIAL; INTRAVENOUS at 18:15

## 2020-01-01 RX ADMIN — LEVOTHYROXINE SODIUM 50 MCG: 0.05 TABLET ORAL at 05:47

## 2020-01-01 RX ADMIN — APIXABAN 5 MG: 5 TABLET, FILM COATED ORAL at 09:09

## 2020-01-01 RX ADMIN — METOPROLOL TARTRATE 25 MG: 25 TABLET, FILM COATED ORAL at 08:48

## 2020-01-01 RX ADMIN — LEVOTHYROXINE SODIUM 50 MCG: 0.05 TABLET ORAL at 05:45

## 2020-01-01 RX ADMIN — Medication 0.4 MG: at 18:45

## 2020-01-01 RX ADMIN — NIFEDIPINE 30 MG: 30 TABLET, EXTENDED RELEASE ORAL at 12:13

## 2020-01-01 RX ADMIN — SUCCINYLCHOLINE CHLORIDE 140 MG: 20 INJECTION, SOLUTION INTRAMUSCULAR; INTRAVENOUS at 16:05

## 2020-01-01 RX ADMIN — METOPROLOL TARTRATE 25 MG: 25 TABLET, FILM COATED ORAL at 08:10

## 2020-01-01 RX ADMIN — METOPROLOL TARTRATE 25 MG: 25 TABLET, FILM COATED ORAL at 09:06

## 2020-01-01 RX ADMIN — GABAPENTIN 300 MG: 300 CAPSULE ORAL at 08:48

## 2020-01-01 RX ADMIN — METOPROLOL TARTRATE 25 MG: 25 TABLET, FILM COATED ORAL at 20:15

## 2020-01-01 RX ADMIN — GABAPENTIN 300 MG: 300 CAPSULE ORAL at 08:08

## 2020-01-01 RX ADMIN — ACETAMINOPHEN 650 MG: 325 TABLET ORAL at 22:13

## 2020-01-01 RX ADMIN — ETOMIDATE 18 MG: 2 INJECTION, SOLUTION INTRAVENOUS at 16:05

## 2020-01-01 RX ADMIN — IOPAMIDOL 100 ML: 755 INJECTION, SOLUTION INTRAVENOUS at 14:57

## 2020-01-01 RX ADMIN — CHLORTHALIDONE 25 MG: 25 TABLET ORAL at 12:13

## 2020-01-01 RX ADMIN — SODIUM CHLORIDE: 9 INJECTION, SOLUTION INTRAVENOUS at 14:01

## 2020-01-01 RX ADMIN — ATORVASTATIN CALCIUM 80 MG: 80 TABLET, FILM COATED ORAL at 21:22

## 2020-01-01 RX ADMIN — SODIUM CHLORIDE, PRESERVATIVE FREE 10 ML: 5 INJECTION INTRAVENOUS at 21:23

## 2020-01-01 RX ADMIN — APIXABAN 5 MG: 5 TABLET, FILM COATED ORAL at 08:08

## 2020-01-01 RX ADMIN — SODIUM CHLORIDE, PRESERVATIVE FREE 10 ML: 5 INJECTION INTRAVENOUS at 20:15

## 2020-01-01 RX ADMIN — METOPROLOL TARTRATE 25 MG: 25 TABLET, FILM COATED ORAL at 22:54

## 2020-01-01 RX ADMIN — ACETAMINOPHEN 650 MG: 325 TABLET ORAL at 10:09

## 2020-01-01 RX ADMIN — SODIUM CHLORIDE, PRESERVATIVE FREE 10 ML: 5 INJECTION INTRAVENOUS at 08:36

## 2020-01-01 RX ADMIN — ALTEPLASE 8.2 MG: KIT at 15:03

## 2020-01-01 ASSESSMENT — PAIN DESCRIPTION - ONSET: ONSET: ON-GOING

## 2020-01-01 ASSESSMENT — PAIN SCALES - GENERAL
PAINLEVEL_OUTOF10: 0
PAINLEVEL_OUTOF10: 0
PAINLEVEL_OUTOF10: 5
PAINLEVEL_OUTOF10: 0
PAINLEVEL_OUTOF10: 8
PAINLEVEL_OUTOF10: 0
PAINLEVEL_OUTOF10: 3
PAINLEVEL_OUTOF10: 0
PAINLEVEL_OUTOF10: 4
PAINLEVEL_OUTOF10: 0
PAINLEVEL_OUTOF10: 7
PAINLEVEL_OUTOF10: 3
PAINLEVEL_OUTOF10: 0
PAINLEVEL_OUTOF10: 0
PAINLEVEL_OUTOF10: 1
PAINLEVEL_OUTOF10: 0
PAINLEVEL_OUTOF10: 6
PAINLEVEL_OUTOF10: 0
PAINLEVEL_OUTOF10: 3
PAINLEVEL_OUTOF10: 0
PAINLEVEL_OUTOF10: 5
PAINLEVEL_OUTOF10: 7

## 2020-01-01 ASSESSMENT — PULMONARY FUNCTION TESTS
PIF_VALUE: 17
PIF_VALUE: 16
PIF_VALUE: 17
PIF_VALUE: 1
PIF_VALUE: 21
PIF_VALUE: 17
PIF_VALUE: 19
PIF_VALUE: 17
PIF_VALUE: 17
PIF_VALUE: 18
PIF_VALUE: 21
PIF_VALUE: 17
PIF_VALUE: 19
PIF_VALUE: 16
PIF_VALUE: 17
PIF_VALUE: 4
PIF_VALUE: 2
PIF_VALUE: 17
PIF_VALUE: 16
PIF_VALUE: 17
PIF_VALUE: 19
PIF_VALUE: 18
PIF_VALUE: 17
PIF_VALUE: 16
PIF_VALUE: 16
PIF_VALUE: 17
PIF_VALUE: 16
PIF_VALUE: 19
PIF_VALUE: 18
PIF_VALUE: 17
PIF_VALUE: 16
PIF_VALUE: 17
PIF_VALUE: 17
PIF_VALUE: 16
PIF_VALUE: 17
PIF_VALUE: 16
PIF_VALUE: 22
PIF_VALUE: 17
PIF_VALUE: 21
PIF_VALUE: 19
PIF_VALUE: 17
PIF_VALUE: 1
PIF_VALUE: 21
PIF_VALUE: 17
PIF_VALUE: 1
PIF_VALUE: 17
PIF_VALUE: 17
PIF_VALUE: 18
PIF_VALUE: 17
PIF_VALUE: 21
PIF_VALUE: 17
PIF_VALUE: 7
PIF_VALUE: 17
PIF_VALUE: 20
PIF_VALUE: 16
PIF_VALUE: 17
PIF_VALUE: 1
PIF_VALUE: 19
PIF_VALUE: 18
PIF_VALUE: 17
PIF_VALUE: 6
PIF_VALUE: 17
PIF_VALUE: 16
PIF_VALUE: 19
PIF_VALUE: 17
PIF_VALUE: 16
PIF_VALUE: 17
PIF_VALUE: 17
PIF_VALUE: 15
PIF_VALUE: 17
PIF_VALUE: 17
PIF_VALUE: 18
PIF_VALUE: 17
PIF_VALUE: 16
PIF_VALUE: 17
PIF_VALUE: 17
PIF_VALUE: 16
PIF_VALUE: 21
PIF_VALUE: 17
PIF_VALUE: 17
PIF_VALUE: 16
PIF_VALUE: 17
PIF_VALUE: 17
PIF_VALUE: 19
PIF_VALUE: 17
PIF_VALUE: 16
PIF_VALUE: 17
PIF_VALUE: 16
PIF_VALUE: 17
PIF_VALUE: 19
PIF_VALUE: 17
PIF_VALUE: 17
PIF_VALUE: 19
PIF_VALUE: 17
PIF_VALUE: 23
PIF_VALUE: 21
PIF_VALUE: 17
PIF_VALUE: 16
PIF_VALUE: 18
PIF_VALUE: 0
PIF_VALUE: 17
PIF_VALUE: 16
PIF_VALUE: 16
PIF_VALUE: 17
PIF_VALUE: 16
PIF_VALUE: 20
PIF_VALUE: 2
PIF_VALUE: 16
PIF_VALUE: 17
PIF_VALUE: 17
PIF_VALUE: 19
PIF_VALUE: 17
PIF_VALUE: 19
PIF_VALUE: 22
PIF_VALUE: 1
PIF_VALUE: 17
PIF_VALUE: 1
PIF_VALUE: 16
PIF_VALUE: 17
PIF_VALUE: 18

## 2020-01-01 ASSESSMENT — ENCOUNTER SYMPTOMS
SHORTNESS OF BREATH: 0
NAUSEA: 0
BACK PAIN: 0
WHEEZING: 0
EYE PAIN: 0
ABDOMINAL PAIN: 0
COUGH: 0
SINUS PRESSURE: 0
VOMITING: 0
DIARRHEA: 0
SORE THROAT: 0

## 2020-01-01 ASSESSMENT — PAIN DESCRIPTION - PAIN TYPE
TYPE: CHRONIC PAIN
TYPE: ACUTE PAIN
TYPE: CHRONIC PAIN
TYPE: ACUTE PAIN

## 2020-01-01 ASSESSMENT — PAIN DESCRIPTION - FREQUENCY
FREQUENCY: CONTINUOUS
FREQUENCY: CONTINUOUS

## 2020-01-01 ASSESSMENT — PAIN DESCRIPTION - LOCATION
LOCATION: GENERALIZED
LOCATION: KNEE;FOOT
LOCATION: GENERALIZED
LOCATION: FOOT;KNEE

## 2020-01-01 ASSESSMENT — PAIN DESCRIPTION - PROGRESSION
CLINICAL_PROGRESSION: NOT CHANGED

## 2020-01-01 ASSESSMENT — PAIN - FUNCTIONAL ASSESSMENT: PAIN_FUNCTIONAL_ASSESSMENT: PREVENTS OR INTERFERES SOME ACTIVE ACTIVITIES AND ADLS

## 2020-01-01 ASSESSMENT — PAIN DESCRIPTION - DESCRIPTORS
DESCRIPTORS: ACHING;DISCOMFORT;SORE
DESCRIPTORS: ACHING;CRAMPING;DISCOMFORT;DULL
DESCRIPTORS: ACHING;DISCOMFORT
DESCRIPTORS: ACHING;CONSTANT;DISCOMFORT

## 2020-01-01 ASSESSMENT — VISUAL ACUITY: VA_NORMAL: 1

## 2020-01-01 ASSESSMENT — PAIN DESCRIPTION - ORIENTATION: ORIENTATION: RIGHT;LEFT

## 2020-05-27 PROBLEM — I63.9 ACUTE CVA (CEREBROVASCULAR ACCIDENT) (HCC): Status: ACTIVE | Noted: 2020-01-01

## 2020-05-27 NOTE — ANESTHESIA PRE PROCEDURE
Department of Anesthesiology  Preprocedure Note       Name:  Pooja Pop   Age:  80 y.o.  :  10/21/1930                                          MRN:  [id number]         Date:  2020      Surgeon: * No surgeons listed *    Procedure: * No procedures listed *    Medications prior to admission:   Prior to Admission medications    Medication Sig Start Date End Date Taking?  Authorizing Provider   gabapentin (NEURONTIN) 300 MG capsule Take 300 mg by mouth daily    Historical Provider, MD   terazosin (HYTRIN) 10 MG capsule Take 10 mg by mouth nightly    Historical Provider, MD   NIFEdipine (ADALAT CC) 30 MG CR tablet Take 30 mg by mouth daily    Historical Provider, MD   atenolol-chlorthalidone (TENORETIC) 100-25 MG per tablet Take 1 tablet by mouth daily    Historical Provider, MD   simvastatin (ZOCOR) 20 MG tablet Take 20 mg by mouth nightly    Historical Provider, MD   levothyroxine (LEVOTHROID) 50 MCG tablet Take 50 mcg by mouth Daily    Historical Provider, MD   Cholecalciferol (VITAMIN D) 2000 UNITS CAPS capsule Take by mouth    Historical Provider, MD   aspirin 81 MG tablet Take 81 mg by mouth daily    Historical Provider, MD   Cyanocobalamin (VITAMIN B-12 IJ) Inject as directed every 30 days    Historical Provider, MD       Current medications:    Current Facility-Administered Medications   Medication Dose Route Frequency Provider Last Rate Last Dose    sodium chloride flush 0.9 % injection 10 mL  10 mL Intravenous Once Latosha Diaz II, MD        sodium chloride flush 0.9 % injection 10 mL  10 mL Intravenous 2 times per day Silver Garcia DO        sodium chloride flush 0.9 % injection 10 mL  10 mL Intravenous PRN Silver Garcia DO        dextrose 50 % IV solution  12.5 g Intravenous Once PRN Silver Garcia DO        0.9 % sodium chloride bolus  50 mL Intravenous Once Silver Garcia DO         Current Outpatient Medications   Medication Sig Dispense Refill    gabapentin (NEURONTIN) 300 MG capsule Comment: occasionally                                Counseling given: Not Answered      Vital Signs (Current):   Vitals:    05/27/20 1503 05/27/20 1512 05/27/20 1518 05/27/20 1533   BP: 138/88 133/66 126/78 136/68   Pulse: 58 59 62 62   Resp: 18 18 18 18   Temp:       TempSrc:       SpO2: 98% 97% 98% 98%   Weight:       Height:                                                  BP Readings from Last 3 Encounters:   05/27/20 136/68   05/27/20 (!) 142/71   04/27/15 124/72       NPO Status:                                                                                 BMI:   Wt Readings from Last 3 Encounters:   05/27/20 200 lb (90.7 kg)   04/27/15 218 lb (98.9 kg)     Body mass index is 30.41 kg/m². CBC:   Lab Results   Component Value Date    WBC 5.6 05/27/2020    RBC 3.87 05/27/2020    HGB 11.5 05/27/2020    HCT 35.3 05/27/2020    MCV 91.2 05/27/2020    RDW 13.4 05/27/2020     05/27/2020       CMP:   Lab Results   Component Value Date     05/27/2020    K 4.5 05/27/2020     05/27/2020    CO2 25 05/27/2020    BUN 25 05/27/2020    CREATININE 1.2 05/27/2020    GFRAA >60 05/27/2020    LABGLOM 57 05/27/2020    GLUCOSE 112 05/27/2020    PROT 6.8 05/27/2020    CALCIUM 8.9 05/27/2020    BILITOT 0.6 05/27/2020    ALKPHOS 68 05/27/2020    AST 16 05/27/2020    ALT 10 05/27/2020       POC Tests: No results for input(s): POCGLU, POCNA, POCK, POCCL, POCBUN, POCHEMO, POCHCT in the last 72 hours.     Coags:   Lab Results   Component Value Date    PROTIME 13.4 05/27/2020    INR 1.2 05/27/2020    APTT 25.9 05/27/2020       HCG (If Applicable): No results found for: PREGTESTUR, PREGSERUM, HCG, HCGQUANT     ABGs: No results found for: PHART, PO2ART, HTN8GCP, UTE6YOL, BEART, F4FPDERW     Type & Screen (If Applicable):  No results found for: LABABO, LABRH    Drug/Infectious Status (If Applicable):  No results found for: HIV, HEPCAB    COVID-19 Screening (If Applicable): No results found for: COVID19      Anesthesia Evaluation  Patient summary reviewed and Nursing notes reviewed no history of anesthetic complications:   Airway: Mallampati: III        Dental:      Comment: None loose    Pulmonary:Negative Pulmonary ROS breath sounds clear to auscultation                             Cardiovascular:    (+) hypertension:,         Rhythm: regular  Rate: normal                    Neuro/Psych:   (+) CVA:,              ROS comment: Acute stroke with Lt. hemiparesis GI/Hepatic/Renal: Neg GI/Hepatic/Renal ROS            Endo/Other:    (+) hypothyroidism::., .                 Abdominal:           Vascular:                                        Anesthesia Plan      general     ASA 4 - emergent         arterial line    Anesthetic plan and risks discussed with patient. Use of blood products discussed with patient whom consented to blood products. Plan discussed with CRNA. Ted Greco DO   5/27/2020    Patient seen and examined, chart reviewed, agree with above findings. Anesthetic plan, risks, benefits, alternatives, and personnel involved discussed with patient. Patient verbalized an understanding and agreed to proceed. NPO status confirmed. Anesthetic plan discussed with care team members and agreed upon.     Ted Greco DO   5/27/2020  4:34 PM
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
## 2020-05-27 NOTE — PROGRESS NOTES
NEUROINTERVENTION PROCEDURE NOTE    PATIENT NAME: Enedelia Segovia  MRN: [id number]  : 10/21/1930  DATE OF PROCEDURE: 20    Stroke Metrics  Last known Well: 0227  NIHSS prior to procedure: 10  Administered prior to coming to IR. IV TPA Administered: [x] Yes  []  No    Consent obtained: [x] Yes  []  No  by granddaughter Zheng Harringtono     Time Out: 9249    Due to the emergent nature of this case, the patient's history is taken by report. Last known well time was 1403. Patient ate around 1330 per report. NIHSS by report was 10. Patient's pedal pulses were palpated at +2 and marked. Neurointerventionalist: Salina Reddy MD  Additional Assistant: Not applicable  1st assistant Semaj Mendez     Procedure: Cerebral angiogram with possible mechanical thrombectomy    Patient in room at 1550 and transferred to IR procedure table at 1555 and prepped and draped sterilely for procedure. Anesthesia arrived at 1600 to intubate and sedate the patient. Time Event Device Reperfusion grade   1615 Groin puncture     1616 Groin access     1740 1st pass (MCA) Pneumbra 3D Revascularization Device  TICI 0   1753 2nd pass (MCA) Pneumbra 3D Revascularization Device  TICI 0                       IA tPA adminstered: [] Yes  [x]  No       Time Event Dose     IA tPA administered      IA tPA administered      IA tPA administered       Reperfusion time (Grade TICI 2B first achieved): Not achieved  Groin closure time: 4239  Sheath pulled and an 8F Angioseal used to close arterial puncture. Puncture site cleansed and dry dressing applied. No bleeding, swelling or complications noted, no change in pulses. Post-procedure NIHSS unable to assess due to sedation and intubation    1823  Patient placed on bed and transported to CT    1833  CT head completed. Dr. Anmol Cano called to read. 9952  Patient transported to SICU and handoff report given to EVAN Dorado.     Family updated: [x] Yes  []  No    Dr. Jennifer Martinez and Ronaldo Pinto RN went and talked

## 2020-05-27 NOTE — ED NOTES
Time Neurologist page:144 telestroke      Time Stroke Alert called:1444  :    Time Neurologist called back:    X-Ray/CT notified:1448     [facility]  05/27/20 0604

## 2020-05-27 NOTE — ED PROVIDER NOTES
Abdomen is soft. Tenderness: There is no abdominal tenderness. There is no guarding or rebound. Musculoskeletal:      Comments: Weakness to left upper and lower extremitties   Skin:     General: Skin is warm and dry. Capillary Refill: Capillary refill takes less than 2 seconds. Neurological:      Mental Status: He is alert and oriented to person, place, and time. Cranial Nerves: Cranial nerve deficit present. Sensory: Sensory deficit present. Motor: Weakness present. NIH Stroke Scale/Score at time of initial evaluation:  1A: Level of Consciousness 0 - alert; keenly responsive   1B: Ask Month and Age 0 - answers both questions correctly   1C: Tell Patient To Open and Close Eyes, then Hand  Squeeze 0 - performs both tasks correctly   2: Test Horizontal Extraocular Movements 2 - forced deviation, or total gaze paresis not overcome by oculocephalic maneuver   3: Test Visual Fields 0 - no visual loss   4: Test Facial Palsy 2 - partial paralysis (total or near total paralysis of the lower face)   5A: Test Left Arm Motor Drift 3 - no effort against gravity, limb falls   5B: Test Right Arm Motor Drift 0 - no drift, limb holds 90 (or 45) degrees for full 10 seconds   6A: Test Left Leg Motor Drift 3 - no effort against gravity; leg falls to bed immediately   6B: Test Right Leg Motor Drift 0 - no drift; leg holds 30 degree position for full 5 seconds   7: Test Limb Ataxia   (FNF/Heel-Shin) 0 - absent   8: Test Sensation 0 - normal; no sensory loss   9: Test Language/Aphasia 0 - no aphasia, normal   10: Test Dysarthria 0 - normal   11: Test Extinction/Inattention 0 - no abnormality   Total Score: 10   5/27/20 at 245pm.      Acute CVA Core Measures:      - t-PA Eligibility: IV t-PA was Administered-Total dose IV tPA is 0.9 mg/kg (maximum 90 mg). Given 10% over one minute, then remaining 90% given as infusion over 60 minutes.   Time T-PA bolus was initiated:  303 PM   - The case has been Protime 13.4 (H) 9.3 - 12.4 sec    INR 1.2    APTT   Result Value Ref Range    aPTT 25.9 24.5 - 35.1 sec   TSH without Reflex   Result Value Ref Range    TSH 4.870 (H) 0.270 - 4.200 uIU/mL   POCT Glucose   Result Value Ref Range    Meter Glucose 114 (H) 74 - 99 mg/dL   EKG 12 Lead   Result Value Ref Range    Ventricular Rate 46 BPM    Atrial Rate 46 BPM    P-R Interval 208 ms    QRS Duration 88 ms    Q-T Interval 524 ms    QTc Calculation (Bazett) 458 ms    P Axis 92 degrees    R Axis -30 degrees    T Axis -9 degrees       RADIOLOGY:    All Radiology results interpreted by Radiologist unless otherwise noted. XR CHEST PORTABLE   Final Result      Cardiomegaly with mild interstitial pulmonary edema         CTA Head W Contrast   Final Result   1. Right middle cerebral artery occlusion . 2. Estimated stenosis by NASCET criteria is not hemodynamically   significant   3. Thickening of the esophagus concerning for a mass               CTA Neck W Contrast   Final Result   1. Right middle cerebral artery occlusion . 2. Estimated stenosis by NASCET criteria is not hemodynamically   significant   3. Thickening of the esophagus concerning for a mass               CT BRAIN PERFUSION   Final Result      Significant ischemic penumbra identified   The results were discussed with the ER physician at the time of this   dictation            CT Head WO Contrast   Final Result   Diffuse atrophy likely age related   Findings compatible with small vessel ischemic changes. Findings compatible with a lacunar infarct, likely old. Findings compatible with a cortical infarct, likely old. IR MECHANICAL ART THROMBECTOMY INTRACRANIAL    (Results Pending)   CT HEAD WO CONTRAST    (Results Pending)   CT head without contrast    (Results Pending)       ---------------------------- NURSING NOTES AND VITALS REVIEWED -------------------------   The nursing notes within the ED encounter and vital signs as below have been reviewed. tablet 80 mg (80 mg Oral Given 5/27/20 2142)   hydrALAZINE (APRESOLINE) injection 10 mg (has no administration in time range)   labetalol (NORMODYNE;TRANDATE) injection 10 mg (10 mg Intravenous Given 5/27/20 2021)   famotidine (PEPCID) injection 20 mg (20 mg Intravenous Given 5/27/20 2137)   iopamidol (ISOVUE-370) 76 % injection 100 mL (100 mLs Intravenous Given 5/27/20 1457)   ioversol (OPTIRAY) 68 % injection (90 mLs Intravenous Given 5/27/20 1815)   heparin (porcine) 5000 Units in sodium chloride 0.9% 1000 mL irrigation (5,000 Units Irrigation Given 5/27/20 1816)   heparin (porcine) 5000 Units in sodium chloride 0.9% 1000 mL irrigation (5,000 Units Irrigation Given 5/27/20 1816)   heparin (porcine) 5000 Units in sodium chloride 0.9% 1000 mL irrigation (5,000 Units Irrigation Given 5/27/20 1817)   heparin (porcine) 5000 Units in sodium chloride 0.9% 1000 mL irrigation (5,000 Units Irrigation Given 5/27/20 1817)   heparin (porcine) 5000 Units in sodium chloride 0.9% 1000 mL irrigation (5,000 Units Irrigation Given 5/27/20 1817)   heparin (porcine) 5000 Units in sodium chloride 0.9% 1000 mL irrigation (5,000 Units Irrigation Given 5/27/20 1817)       CONSULTATIONS:            Consultation:  I Spoke with Dr. Santiago Mabry (Neurology). Discussed case. They will provide consultation. Consultation:  I Spoke with Dr. Michaela Lester (IR). Discussed case. They will provide consultation and take the patient for IR. Consultation:  I Spoke with Dr. Indira Jewell (Medicine). Discussed case. They will admit this patient. Consultation:  I Spoke with Dr. Kindra Tariq (Critical care). Discussed case. They will provide consultation. PROCEDURES:            none.       COUNSELING:   I have spoken with the granddaughter and patient and discussed todays results, in addition to providing specific details for the plan of care and counseling regarding the diagnosis and prognosis.     --------------------------------------- IMPRESSION & DISPOSITION

## 2020-05-27 NOTE — BRIEF OP NOTE
Brief Postoperative Note    Yamile Jay  YOB: 1930  [id number]    Pre-operative Diagnosis and Procedure: agnio and embolectomy    Post-operative Diagnosis: Same    Anesthesia: Local    Estimated Blood Loss: < 10 cc    Surgeon: Eyal BRIONES     Complications: none    Specimen obtained: none     Findings: none     Jordyn Walsh II   5/27/2020 4:12 PM

## 2020-05-28 NOTE — CONSULTS
[facility]  SURGICAL INTENSIVE CARE UNIT (SICU)  ATTENDING PHYSICIAN CRITICAL CARE CONSULT NOTE       CC: acute CVA      HPI: 61-year-old male who presented today to the ED with acute onset of left-sided weakness. He was seen by tele-stroke and noted to be hemiplegic and dysarthritic. He had an NIH stroke scale of 10. Because of the acute left hemiparesis patient was not deemed a TPA candidate by tele-stroke. Was given TPA. Tele-stroke also spoke to the neuro interventional radiologist Dr. Vilma Romeo and they believe that he would be a good thrombectomy candidate. Dr. Vilma Romeo attempted to perform a thrombectomy but the procedure was unsuccessful. Afterwards the patient remained intubated and was transferred to surgical ICU after the IR procedure    SURGICAL/INTERVENTIONAL PROCEDURES:   May 27--IV TPA given and attempted thrombectomy    PMI:  has a past medical history of Cancer ([facility] [address] Hyperlipidemia, Hypertension, Hypothyroidism, Neuropathy, and Skin cancer. PSH:  has no past surgical history on file. Home Medications:    Prior to Admission medications    Medication Sig Start Date End Date Taking?  Authorizing Provider   gabapentin (NEURONTIN) 300 MG capsule Take 300 mg by mouth daily    Historical Provider, MD   terazosin (HYTRIN) 10 MG capsule Take 10 mg by mouth nightly    Historical Provider, MD   NIFEdipine (ADALAT CC) 30 MG CR tablet Take 30 mg by mouth daily    Historical Provider, MD   atenolol-chlorthalidone (TENORETIC) 100-25 MG per tablet Take 1 tablet by mouth daily    Historical Provider, MD   simvastatin (ZOCOR) 20 MG tablet Take 20 mg by mouth nightly    Historical Provider, MD   levothyroxine (LEVOTHROID) 50 MCG tablet Take 50 mcg by mouth Daily    Historical Provider, MD   Cholecalciferol (VITAMIN D) 2000 UNITS CAPS capsule Take by mouth    Historical Provider, MD   aspirin 81 MG tablet Take 81 mg by mouth daily    Historical Provider, MD   Cyanocobalamin (VITAMIN B-12 IJ) Inject as directed every 30 days    Sissy Child MD       Current Medication:   Current Facility-Administered Medications:     sodium chloride flush 0.9 % injection 10 mL, 10 mL, Intravenous, Once, Biju Pink II, MD    sodium chloride flush 0.9 % injection 10 mL, 10 mL, Intravenous, 2 times per day, Josee Lane DO    sodium chloride flush 0.9 % injection 10 mL, 10 mL, Intravenous, PRN, Josee Lane DO    dextrose 50 % IV solution, 12.5 g, Intravenous, Once PRN, Josee Lane DO    [COMPLETED] alteplase (ACTIVASE) injection 8.2 mg, 0.09 mg/kg, Intravenous, Once, 8.2 mg at 05/27/20 1503 **FOLLOWED BY** [COMPLETED] alteplase (ACTIVASE) injection 73.5 mg, 0.81 mg/kg, Intravenous, Once, 73.5 mg at 05/27/20 1504 **FOLLOWED BY** 0.9 % sodium chloride bolus, 50 mL, Intravenous, Once, Josee Lane DO    morphine (PF) injection 1 mg, 1 mg, Intravenous, Q5 Min PRN, Remus Lair, DO    morphine (PF) injection 2 mg, 2 mg, Intravenous, Q5 Min PRN, Remus Lair, DO    morphine (PF) injection 1 mg, 1 mg, Intravenous, Q5 Min PRN, Remus Lair, DO    morphine (PF) injection 2 mg, 2 mg, Intravenous, Q5 Min PRN, Marina Kim,     ondansetron [facility]) injection 4 mg, 4 mg, Intravenous, Once PRN, Remus Lair, DO    sodium chloride flush 0.9 % injection 10 mL, 10 mL, Intravenous, 2 times per day, Zackary Hamilton MD    sodium chloride flush 0.9 % injection 10 mL, 10 mL, Intravenous, PRN, Zackary Hamilton MD    polyethylene glycol [facility]) packet 17 g, 17 g, Oral, Daily PRN, Zackary Hamilton MD    promethazine (PHENERGAN) tablet 12.5 mg, 12.5 mg, Oral, Q6H PRN **OR** ondansetron (ZOFRAN) injection 4 mg, 4 mg, Intravenous, Q6H PRN, Zackary Hamilton MD  [facility]  [START ON 5/28/2020] enoxaparin (LOVENOX) injection 40 mg, 40 mg, Subcutaneous, Daily, Zackary Hamilton MD    [START ON 5/28/2020] aspirin EC tablet 81 mg, 81 mg, Oral, Daily **OR** [START ON 5/28/2020] aspirin suppository 300 mg, 300 mg, Rectal, Daily, Gianluca Kendrick MD    atorvastatin (LIPITOR) tablet 80 mg, 80 mg, Oral, Nightly, Gianluca Kendrick MD     Allergies: Allergies   Allergen Reactions    Other      tetnus shot a long time ago        Family History: History reviewed. No pertinent family history. ROS: Review of systems not obtained due to patient factors. Vitals: Blood pressure (!) 141/73, pulse 61, temperature 95.8 °F (35.4 °C), temperature source Axillary, resp. rate 21, height 5' 8\" (1.727 m), weight 200 lb (90.7 kg), SpO2 100 %. Body mass index is 30.41 kg/m². Intake/Output Summary (Last 24 hours) at 5/27/2020 2004  Last data filed at 5/27/2020 1816  Gross per 24 hour   Intake 650 ml   Output 100 ml   Net 550 ml     PHYSICAL EXAM:  Intubated  Off sedation  Follows simple commands  Left side weaker than right  Normal sinus  Lungs clear  Abdomen soft nontender nondistended prior midline scar  Rt groin hematoma    Radiology:     Assessment:      Plan:  --Acute CVA, right MCA M1 occlusion--status post TPA on May 27, attempted thrombectomy and failure to retrieve clot  Discussed with dr Chuck Franklin systolic pressure less than 140/90 decreased risk for bleeding  Post TPA precautions  Aspirin to start tomorrow 24 hours post TPA  Lipitor  Monitor neuro status    --Acute respiratory failure status post acute CVA--pressure support trials. We will wean to extubate the patient    --N.p.o.    --Irregular heartbeats. We will check a 12-lead EKG. We will check electrolytes and correct them    ICU PROPHYLAXIS:   Stress ulcer: H2 blocker   VTE: lovenox      DISPOSITION:   Continue ICU    Critical care time exclusive of teaching and procedures = 35 min     Pt needs continuous ICU monitoring because the patient is at risk for life threatening deterioration from a neurological standpoint.      Trinh Alonso MD, FACS  5/27/2020  8:04 PM      NOTE: This report was transcribed using voice recognition software. Every effort was made to ensure accuracy; however, inadvertent computerized transcription errors may be present.

## 2020-05-28 NOTE — PROGRESS NOTES
Verbal Sequencing:   Functional        Verbal Problem solving:   Functional          CLINICAL OBSERVATIONS NOTED DURING THE EVALUATION  Within functional limits                  Prognosis for improvements is good  This plan will be re-evaluated and revised in 1 week  if warranted. Patient stated goals: Agreed with above  Treatment goals discussed with Patient   The Patient understand(s) the diagnosis, prognosis and plan of care       CPT code:    72429  eval speech sound lang comprehension        The admitting diagnosis and active problem list, as listed below have been reviewed prior to initiation of this evaluation.         ACTIVE PROBLEM LIST:   Patient Active Problem List   Diagnosis    Acute CVA (cerebrovascular accident) ([facility] [address]

## 2020-05-28 NOTE — PROGRESS NOTES
Physical Therapy  Physical Therapy Initial Assessment     Name: Dejon Orourke  : 10/21/1930  MRN: [id number]    Referring Provider:  Poncho Bernardo MD    Date of Service: 2020    Evaluating PT:  Charles Sinclair, PT, DPT [id number]    Room #:  7977/4702-M  Diagnosis:  Acute CVA, R MCA  Precautions: Falls, L hemiparesis, L inattention, O2  Procedure/Surgery:   tPA, attempted thrombectomy  PMHx/PSHx:  CA, HLD, HTN, Neuropathy  Equipment Needs:  TBD    SUBJECTIVE:    Pt lives with wife in a 2 story home with 2 stairs to enter and 1 rail. Full flight of steps and 1 rail to bedroom. Pt ambulated with SPC and was independent PTA. OBJECTIVE:   Initial Evaluation  Date: 20 Treatment Short Term/ Long Term   Goals   AM-PAC 6 Clicks      Was pt agreeable to Eval/treatment? Yes     Does pt have pain?  No c/o pain     Bed Mobility  Rolling: NT  Supine to sit: Dep x 2  Sit to supine: Dep x 2  Scooting: Dep   ModA   Transfers Sit to stand: MaxA x 2  Stand to sit: MaxA x 2  Stand pivot: NT  ModA with AAD   Ambulation   NT  >15 feet with ModA with AAD   Stair negotiation: ascended and descended NT  >2 steps with 1 rail ModA   ROM BUE:  Defer to OT note  BLE:  WNL     Strength BUE:  Defer to OT note  RLE:  3+ to 4-/5  L knee ext: 2/5  Increase by 1/3 MMT grade   Balance Sitting EOB:  Dep  Dynamic Standing:  Dep x 2  Sitting EOB:  Beth  Dynamic Standing:  ModA with AAD     Pt is A & O x 3  CAM-ICU: Positive  RASS: 0  Sensation:  No paresthesias reported  Edema:  None    Vitals:  Heart Rate at rest 61 bpm Heart Rate post session 65 bpm   SpO2 at rest 98% SpO2 post session 100%   Blood Pressure at rest 134/61 mmHg Blood Pressure post session 126/62 mmHg     Functional Status Score-Intensive Care Unit (FSS-ICU)   Rolling -/   Supine to sit transfer 1   Unsupported sitting  /   Sit to stand transfers    Ambulation -/   Total  3/35       Therapeutic Exercises:  NA    Patient education  Pt educated on

## 2020-05-28 NOTE — PROGRESS NOTES
Patient was given stroke education folder and was given stroke education bedside. Patient verbalizes understanding but needs reinforced education throughout hospital stay. Some admission assessment was complete, but some remains left to be done by oncoming nurse.

## 2020-05-28 NOTE — CONSULTS
Comment: occasionally    Drug use: No     History reviewed. No pertinent family history. History reviewed. No pertinent surgical history. Past Medical History:   Diagnosis Date    Cancer [facility]) 2008    prostate    Hyperlipidemia     Hypertension     Hypothyroidism     Neuropathy     feet    Skin cancer      Review of Systems   All other systems reviewed and are negative. Objective:   BP (!) 107/52   Pulse 64   Temp 97.6 °F (36.4 °C) (Oral)   Resp 25   Ht 5' 8\" (1.727 m)   Wt 182 lb 15.7 oz (83 kg)   SpO2 100%   BMI 27.82 kg/m²     Physical Exam  Constitutional:       General: He is awake. He is not in acute distress. Appearance: Normal appearance. HENT:      Head: Normocephalic and atraumatic. Eyes:      General: Lids are normal.      Extraocular Movements: Extraocular movements intact. Conjunctiva/sclera: Conjunctivae normal.      Pupils: Pupils are equal, round, and reactive to light. Neck:      Musculoskeletal: Normal range of motion and neck supple. Cardiovascular:      Rate and Rhythm: Normal rate and regular rhythm. Pulses: Normal pulses. Heart sounds: Normal heart sounds. Pulmonary:      Effort: Pulmonary effort is normal.      Breath sounds: Normal breath sounds. Musculoskeletal:         General: No deformity or signs of injury. Skin:     General: Skin is warm and dry. Neurological:      Mental Status: He is alert and oriented to person, place, and time. Cranial Nerves: Dysarthria present. No cranial nerve deficit. Sensory: Sensory deficit present. Motor: Weakness present. Deep Tendon Reflexes:      Reflex Scores:       Bicep reflexes are 3+ on the right side and 3+ on the left side. Brachioradialis reflexes are 3+ on the right side and 3+ on the left side. Patellar reflexes are 3+ on the right side and 3+ on the left side. Achilles reflexes are 3+ on the right side and 3+ on the left side.   Psychiatric: Mood and Affect: Mood normal.         Behavior: Behavior normal.         Thought Content: Thought content normal.         Judgment: Judgment normal.                 Neurological Exam  Mental Status  Awake and alert. Oriented only to person, place, time and situation. Moderate dysarthria present. Able to name objects, name parts of objects and repeat. Attention and concentration are normal.    Cranial Nerves  CN II: Visual acuity is normal. Visual fields full to confrontation. CN III, IV, VI: Extraocular movements intact bilaterally. Normal lids and orbits bilaterally. Pupils equal round and reactive to light bilaterally. CN V: Facial sensation is normal.  CN VII: Full and symmetric facial movement. CN VIII: Hearing is normal.  CN IX, X: Palate elevates symmetrically. Normal gag reflex. CN XI: Shoulder shrug strength is normal.  CN XII: Tongue midline without atrophy or fasciculations. Motor   Left hemiparesis. Sensory  Left hemisensory loss.      Reflexes                                           Right                      Left  Brachioradialis                    3+                         3+  Biceps                                 3+                         3+  Patellar                                3+                         3+  Achilles                                3+                         3+      Laboratory/Radiology:     CBC with Differential:    Lab Results   Component Value Date    WBC 12.2 05/28/2020    RBC 3.10 05/28/2020    HGB 9.3 05/28/2020    HCT 28.1 05/28/2020     05/28/2020    MCV 90.6 05/28/2020    MCH 30.0 05/28/2020    MCHC 33.1 05/28/2020    RDW 13.3 05/28/2020    LYMPHOPCT 17.9 05/27/2020    MONOPCT 9.8 05/27/2020    BASOPCT 0.7 05/27/2020    MONOSABS 0.55 05/27/2020    LYMPHSABS 1.00 05/27/2020    EOSABS 0.06 05/27/2020    BASOSABS 0.04 05/27/2020     BMP:    Lab Results   Component Value Date     05/28/2020    K 4.1 05/28/2020     05/28/2020    CO2 22

## 2020-05-28 NOTE — CARE COORDINATION
Admitted d/t R MCA stroke d/t thrombus. S/p tPA, unsuccessful IR attempt at thrombectomy. Vw left for daughter Gary Larson to discuss transition of care.

## 2020-05-28 NOTE — PROGRESS NOTES
[facility]  SURGICAL INTENSIVE CARE UNIT (SICU)  ATTENDING PHYSICIAN CRITICAL CARE PROGRESS NOTE     I have examined the patient, reviewed the record, and discussed the case with the APN/ resident. Please refer to the APN/ resident's note. I agree with the assessment and plan. I have reviewed all relevant labs and imaging data. The following summarizes my clinical findings and independent assessment. CC:  Critical care management after stroke; t-PA    Hospital Course/Overnight Events:  5/27--presented with left sided weakness; found to have right MCA infarct--given t-PA; underwent attempted IR clot retrieval, but unsuccessful  5/28--no new issues    Pt denies headache.     Asleep but arousable  Follows commands  Dysarthric speech  Hrt:  Regular  Lungs:  Fairly clear bilaterally  Abd:  Soft; BS +; NT/ND  Skin:  Warm/dry  Ext:  Left side flaccid    Patient Active Problem List    Diagnosis Date Noted    Acute CVA (cerebrovascular accident) ([facility] [address] 05/27/2020       S/p right MCA infarct--s/p t-PA--monitor neuro exam  Echo  Statin  No anti-platelet therapy until 24 hours post t-PA  Diet as tolerated  PT/OT/Speech evals  DVT risk--PCDs    Pt is at risk for neurologic deterioration and requires ongoing ICU care    Rancho Ibarra MD, FACS  5/28/2020  12:33 PM      Critical care time exclusive of teaching and procedures = 37 minutes

## 2020-05-29 NOTE — PLAN OF CARE
Problem: Falls - Risk of:  Goal: Will remain free from falls  Description: Will remain free from falls  Outcome: Met This Shift     Problem: Pain:  Goal: Pain level will decrease  Description: Pain level will decrease  Outcome: Met This Shift     Problem: HEMODYNAMIC STATUS  Goal: Patient has stable vital signs and fluid balance  Outcome: Met This Shift

## 2020-05-29 NOTE — PROGRESS NOTES
OT BEDSIDE TREATMENT NOTE      Date:2020  Patient Name: Lazaro Mckay  MRN: [id number]  : 10/21/1930  Room: [facility]      Referring Provider: Elba Gallardo MD     Evaluating OT: Dudley Byrd OTR/L [id number]        Modified [place] Scale   Score     Description  0             No symptoms  1             No significant disability despite symptoms  2             Slight disability; able to look after own affairs  3             Moderate disability; able to ambulate without assist/ requires assist with ADLs  4             Moderate/Severe disability;requires assist to ambulate/assist with ADLs  5             Severe disability;bedridden/incontinent   6               Score:   5        AM-PAC Daily Activity Raw Score:      Recommended Adaptive Equipment: TBA: ADL AE, bathroom DME, AD as indicated. Monitor splinting needs.         Diagnosis:  Acute CVA, R MCA     Reason for admission: Pt slid out of chair- L sided flaccid with L facial droop and dysarthria        Pertinent Medical History: Prostate Ca, skin Ca HLD, HTN, hypothyroid, neuropathy     Surgery:  tPA, attempted thrombectomy     Precautions: Falls, L hemiparesis, L inattention, O2      Home Living: Pt lives with wife  in a 2 story home with 2 step(s) to enter and 1 rail(s); bed/bath on 2nd floor. Bathroom on first floor? Bathroom setup: walk in shower on 2nd floor  Equipment owned: [facility]     Prior Level of Function: IND with ADLs;  IND with IADLs. SPC for ambulation. Driving: yes  Occupation: retired     Pain Level: pt c/o no pain  this session        Cognition: A&O: 4/4    Follows 1-2 step commands with min cues due to perceptual deficits. Pt more focused today; improved attention to instruction with improved processing and participation in ex's.               Memory: fair-              Comprehension fair-              Problem solving: poor+              Judgement/safety: poor+                 Communication skills: dysarthric speech; fairly

## 2020-05-30 NOTE — PROGRESS NOTES
sternocleidomastoid strength    XI: neck extension strength     XII: tongue strength  Normal     Motor:  Left hemiparesis   Left  1-2/5   Left bicep 1/5 today    Left IPS 0/5   Left gastroc 4/5   Left ant tib 2/5    Sensory:  Appreciates LT and vibration in ankles     Coordination:   FN, FFM and EVENS normal right    Decreased left relative to weakness       DTR:   Right Brachioradialis reflex 1+  Left Brachioradialis reflex 1+  Right Biceps reflex 1+  Left Biceps reflex 1+  Right Quadriceps reflex 1+  Left Quadriceps reflex 1+  Right Achilles reflex 0  Left Achilles reflex 0    No Babinski  No Santamaria's     NIH Stroke Scale:    1A: Level of Consciousness 0 - alert; keenly responsive   1B: Ask Month and Age 0 - answers both questions correctly   1C:  Tell Patient To Open and Close Eyes, then Hand  Squeeze 0 - performs both tasks correctly   2: Test Horizontal Extraocular Movements 0 - normal   3: Test Visual Fields 0 - no visual loss   4: Test Facial Palsy 2 - partial paralysis (total or near total paralysis of the lower face)   5A: Test Left Arm Motor Drift 2 - some effort against gravity, limb cannot get to or maintain (if cued) 90 (or 45) degrees, drifts down to bed, but has some effort against gravity    5B: Test Right Arm Motor Drift 0 - no drift, limb holds 90 (or 45) degrees for full 10 seconds   6A: Test Left Leg Motor Drift 3 - no effort against gravity; leg falls to bed immediately   6B: Test Right Leg Motor Drift 0 - no drift; leg holds 30 degree position for full 5 seconds   7: Test Limb Ataxia   (FNF/Heel-Shin) 0 - absent   8: Test Sensation 0 - normal; no sensory loss   9: Test Language/Aphasia 0 - no aphasia, normal   10: Test Dysarthria 1 - mild to moderate, patient slurs at least some words and at worst, can be understood with some difficulty   11: Test Extinction/Inattention 0 - no abnormality   Total 8       Laboratory/Radiology:     CBC with Differential:    Lab Results   Component Value

## 2020-05-30 NOTE — PROGRESS NOTES
Neuro Science Intensive Care Unit  Critical Care  Daily Progress Note 5/30/2020    Date of Admission:5/27/20    CC:  Stroke s/p tPA      HOSPITAL EVENTS  5/27 Presented to ED with cc left sided weakness, slurring of words, facial drooping. NIHSS 10. Stroke protocol activated. tPA administered at 1503. Diagnostics revealed embolism of RMCA. Went to IR - unsuccessful thrombectomy. Admitted to ICU.   5/28 24 hr CT negative for hemorrhage. 5/29 Hypotensive. 5/30 Remained hypotensive. Stable. OVERNIGHT EVENTS: T max 98.6F. Did not require additional doses of antihypertensive agents or insulin in the previous 24 hours. PHYSICAL EXAM:    /78   Pulse 96   Temp 97.7 °F (36.5 °C) (Axillary)   Resp 25   Ht 5' 8\" (1.727 m)   Wt 186 lb 8.2 oz (84.6 kg)   SpO2 92%   BMI 28.36 kg/m²     Intake/Output Summary (Last 24 hours) at 5/30/2020 0821  Last data filed at 5/30/2020 0600  Gross per 24 hour   Intake 1682 ml   Output 200 ml   Net 1482 ml       General appearance:  Comfortable. NEUROLOGIC:        GCS:    4 - Opens eyes on own   6 - Follows simple motor commands  5 - Alert and oriented       Pupil size:  Left 3 mm  Right 3 mm  Pupil reaction: Yes   PERRLA  Wiggles fingers: Left Yes Right Yes  Hand grasp:   Left decreased    Right present  Wiggles toes: Left Yes    Right Yes  Plantar flexion: Left decreased    Right present  Facial droop:   None   Speech:  Clear. CONSTITUTIONAL: No acute distress  CARDIOVASCULAR: S1 S2, regular rate, irregular rhythm,Monitor: A fib/flutter  PULMONARY:  Respirations unlabored. No rhonchi/rales/wheezes. ABDOMEN: Soft, nontender, nondistended, nontympanic, normal bowel sounds. MUSCULOSKELETAL: Left sided hemiplegia. SKIN/EXTREMITIES: No rashes/ecchymosis, no edema/clubbing, warm/dry, good capillary refill. IV ACCESS:   KEISHA Deras Semen        ASSESSMENT/PLAN:     Active Problems:    Acute CVA (cerebrovascular accident) [facility])    Cerebrovascular accident (CVA) due to embolism of right middle cerebral artery (HCC)  Resolved Problems:    * No resolved hospital problems. *    Neuro:  Stroke s/p tPA. Unsuccessful thrombectomy R MCA. Neurology following. Monitor neuro status. Repeat Ct H-neg for hemorrhage. Stroke protocol, stroke education. SLP for cognitive evaluation and speech therapy. CV: Hypotension. IVF. Dickson Ace Hx HTN. HLD. SBP goal >110 mm Hg. Pulm: No acute issues. GI: Diet. Appetite poor. Renal: No acute issues. Monitor uop, renal function and electrolytes  ID: No Leukocytosis. Afebrile. Endocrine:  Hyperglycemia. Monitor BS. Hx Hypothyroid. Synthroid. MSK:. Left sided weakness. PT/OT [facility]   6/24  Heme: Anemia. - worsening. Hx CA of prostate. Skin CA. Follow CBC        Bowel regime: senna. Pain control/Sedation:  Acetaminophen  DVT prophylaxis: SCDs. No chemical prophylaxis dt tPA. GI prophylaxis: Diet  Glucose protocol: Follow labs  Mouth/Eye care: As needed. [place]: Keep in place for critical care monitoring of fluid balance. Consults:   Medicine. Neurology  Patient/Family update: Will update as family available  Code status:  Full      Disposition: Transfer from [facility].         MATTHEW Mendoza  5/30/2020  8:21 AM

## 2020-05-30 NOTE — CONSULTS
of hearing and difficulty with speech. FAMILY HISTORY:   Unable to obtain at this time due to patient being hard of hearing and difficulty with speech. REVIEW OF SYSTEMS:     Limited at this time due to patient being hard of hearing and difficulty with speech, but denies any chest pain, shortness of breath, dizziness, palpitations. PHYSICAL EXAM:   BP (!) 107/59   Pulse 99   Temp 97.8 °F (36.6 °C) (Oral)   Resp 18   Ht 5' 8\" (1.727 m)   Wt 186 lb 8.2 oz (84.6 kg)   SpO2 95%   BMI 28.36 kg/m²   CONST:  Well developed, elderly WM who appears stated age. Awake, alert, cooperative, no apparent distress. HEENT:   Head- Normocephalic, atraumatic. Eyes- Conjunctivae pink, anicteric. Neck-  No stridor, trachea midline, no jugular venous distention. CHEST: Chest symmetrical and non-tender to palpation. No accessory muscle use or intercostal retractions. RESPIRATORY: Lung sounds - clear throughout fields. No wheezing, rales or rhonchi. CARDIOVASCULAR:     No carotid bruit. Heart Inspection- shows no noted pulsations. Heart Palpation- no heaves or thrills; PMI is non-displaced. Heart Ausculation- irregularly irregular rate and rhythm, 1/6 systolic murmur. No s3, s4 or rub. PV: Trace lower extremity edema. No varicosities. Pedal pulses palpable, no clubbing or cyanosis. ABDOMEN: Soft, non-tender to light palpation. Bowel sounds present. SKIN: Warm and dry. NEURO / PSYCH: Oriented to person, place. Slight difficulty with speech and mild residual left upper and lower extremity weakness. Follows all commands. DATA:    Telemetry: Atrial fibrillation with HR in the 80s-90s    Diagnostic:  All diagnostic testing and lab work thus far this admission reviewed in detail. CT Head 05/27/2020: Impression:  Diffuse atrophy likely age related  Findings compatible with small vessel ischemic changes. Findings compatible with a lacunar infarct, likely old.   Findings compatible with a Component Value Date    LABA1C 5.7 (H) 05/28/2020     PT/INR:   Recent Labs     05/27/20  1445   PROTIME 13.4*   INR 1.2     APTT:  Recent Labs     05/27/20  1445   APTT 25.9     CARDIAC ENZYMES:  Recent Labs     05/27/20  1526   TROPONINI <0.01     FASTING LIPID PANEL:  Lab Results   Component Value Date    CHOL 103 05/28/2020    HDL 52 05/28/2020    LDLCALC 37 05/28/2020    TRIG 69 05/28/2020     LIVER PROFILE:  Recent Labs     05/27/20  1526   AST 16   ALT 10   LABALBU 4.2         Assessment and plan as per Dr. Charo Arnold. Electronically signed by Kiara Tidwell PA-C on 5/30/2020 at 11:29 AM       I personally and independently saw and examined patient and reviewed all done pertinent laboratory data, imaging studies, ECGs and rhythm strips. I have reviewed and agree with the APN history and physical exam as documented in the above note. Electronically signed by Lexy Brar MD on 5/30/2020 at 2:22 PM     We were asked to see the patient for oral anticoagulation for history of atrial fibrillation.      IMPRESSION:  1. Recurrent atrial fibrillation. Known history of paroxysmal atrial fibrillation. Was on aspirin therapy and not on oral anticoagulation. 2. Right middle cerebral artery CVA with left sided weakness s/p unsuccessful embolectomy/recannulization. 3. History of hypertension. 4. Hypothyroidism, on replacement therapy. 5. Hyperlipidemia, on statin therapy. 6. History of prostate cancer. 7. History of bilateral lower extremity neuropathy. 8. History of Vitamin D deficiency. 9. History of bilateral hearing loss. 10. Prerenal azotemia. 11. Anemia.       PLAN:   1. Start Eliquis 5mg PO BID (when okay with neurology). 2. Discontinue aspirin upon starting Eliquis. 3. Resume beta blocker therapy: will start metoprolol instead of atenolol. 4. Continue statin. 5. Will review echocardiogram when done. 6. Cardiology will sign off (pending results of echo). Please call if needed.

## 2020-06-01 NOTE — PLAN OF CARE
Problem: Falls - Risk of:  Goal: Will remain free from falls  Description: Will remain free from falls  Outcome: Met This Shift  Goal: Absence of physical injury  Description: Absence of physical injury  Outcome: Met This Shift     Problem: Pain:  Goal: Pain level will decrease  Description: Pain level will decrease  Outcome: Met This Shift  Goal: Control of acute pain  Description: Control of acute pain  Outcome: Met This Shift  Goal: Control of chronic pain  Description: Control of chronic pain  Outcome: Met This Shift     Problem: HEMODYNAMIC STATUS  Goal: Patient has stable vital signs and fluid balance  Outcome: Met This Shift

## 2020-06-01 NOTE — PROGRESS NOTES
LE  Ankle dorsiflexion 15x ea AAROM on L LE, AROM of R LE  Lateral weight shifting to elbow/forearm 5x ea direction with Mod/Max  Sitting reaching activity crossing midline and reaching outside RENALDO with R UE x5 minutes. Patient education  Pt educated on attention to L side, sitting balance, completion of bed mobility and transfers    Patient response to education:   Pt verbalized understanding Pt demonstrated skill Pt requires further education in this area   yes yes yes     ASSESSMENT:    Comments:  Pt resting supine upon arrival, agreeable to PT session with OT collaboration. Pt demonstrates R gaze throughout majority of session, able to scan to L with max cues. Pt demonstrated  with R UE on bed rail with manual assist to guide towards rail across body. Pt with trace movements of proximal L UE, unable to produce grasp. Pt required assist for placement of LEs during all attempts for rolling. He required assist for B LE management and trunk lift/lower with supine<>sit. Upon sitting EOB pt demonstrates slouched posture with L lateral lean and posterior rotation of L side, pushing through R UE when R UE resting on EOB- decreased when placed in lap. Pt able to correct posture towards midline with verbal/visual cues. Pt was able to complete 50% ROM of L LAQ consistently throughout all repetitions. Pt required total assistance x2 for sit<>stand, no active use of L UE and L LE- therapist blocking L knee and supporting L UE, strong L lateral trunk lean to attempt 2x, unable to achieve full stance on either attempt (50% lift). Pt returned to bed upon completion of session with all needs in reach, TAPS replaced, L UE elevated on pillowsfor improved positioning and joint protection.      Treatment:  Patient practiced and was instructed in the following treatment:     Bed mobility: verbal/manual cues for positioning of B LEs and B UEs to increase participation in rolling and supine<>sit, manual assistance to complete

## 2020-06-01 NOTE — CARE COORDINATION
SW spoke with Kirita ([place] at [facility], informed her we will have therapy update notes and see how patient is doing, they will eval after therapy updates to see if able to accept.

## 2020-06-01 NOTE — PROGRESS NOTES
OT BEDSIDE TREATMENT NOTE      Date:2020  Patient Name: Jurgen Martinez  MRN: [id number]  : 10/21/1930  Room: [address] [place] [address]-A      Referring Provider: Susanna Blackman MD     Evaluating OT: Saleem Hussein OTR/L [id number]        Modified Sargent Scale   Score     Description  0             No symptoms  1             No significant disability despite symptoms  2             Slight disability; able to look after own affairs  3             Moderate disability; able to ambulate without assist/ requires assist with ADLs  4             Moderate/Severe disability;requires assist to ambulate/assist with ADLs  5             Severe disability;bedridden/incontinent   6               Score:   5     AM-PAC Daily Activity Raw Score:      Recommended Adaptive Equipment: TBA: ADL AE, bathroom DME, AD as indicated. Monitor splinting needs. Diagnosis:  Acute CVA, R MCA     Reason for admission: Pt slid out of chair- L sided flaccid with L facial droop and dysarthria        Pertinent Medical History: Prostate Ca, skin Ca HLD, HTN, hypothyroid, neuropathy     Surgery:  tPA, attempted thrombectomy     Precautions: Falls, L hemiparesis, L inattention,      Home Living: Pt lives with wife  in a 2 story home with 2 step(s) to enter and 1 rail(s); bed/bath on 2nd floor. Bathroom on first floor? Bathroom setup: walk in shower on 2nd floor  Equipment owned: [facility]     Prior Level of Function: IND with ADLs;  IND with IADLs. SPC for ambulation.    Driving: yes  Occupation: retired     Pain Level: pt c/o no pain  this session     Cognition: A&O: 4/4  Follows 1-2 step commands with min cues, easily distracted, pleasant & cooperative, slight slurred speech               Memory: fair-              Comprehension fair-              Problem solving: poor+              Judgement/safety: poor+     Functional Assessment    Initial Eval Status  Date:  Treatment Status  Date: 20 STG=LTG  7-14  days    Feeding NPO SBA  Taking a drink using R UE       Re-assess when indicated                                          Grooming Max A  Mod cues to locate L side of body Min A  Using R UE, supported upright in bed with simple ADL task                        Min A   while seated supported & demonstrating G knowledge of compensatory techniques; using L UE as fair stabilizer.      UB dressing/bathing Dep Max A  Hissop & donning gown upright in bed supported                        Mod A  demonstrating fair initiation and follow through of jayy dressing techniques and requiring min cues for L sided body awareness during tasks.          LB dressing/bathing Dep Dep  Walter & doff socks in bed                       Max A   using AE as needed for safe reach/ energy conservation        Toileting NT Dep  Incontinent of urine, attempted to use urinal but unable to urinate                             Bed Mobility  Supine to sit: Dep+2     Sit to supine: Dep+2 Supine<>sit: Max A +2 with mod directional cues                        Mod A  in prep of ADL tasks & transfers   Functional Transfers Sit to stand: Max A+2     Stand to sit: Dep+2  Sit<>stand: Max/Dep+2 side by side assist, only able to achieve partial stand, flexed forward posture                       Mod A  sit<>stand/functional bathroom transfers using AD/DME as needed for balance and safety   Functional Mobility NT NT                       Mod A+2   functional/bathroom mobility using AD as needed & demonstrating F safety      Balance Sitting:     Static:  Dep; pushing to L side- impaired spatial awareness     Dynamic:Dep  Standing: Max A+2/Dep Sitting: Mod A  Periods of Min A  L lateral lean, constant cues to correct back to mid-line with Fair results    Standing: Max A +2   Completed x 2 Min A dynamic sitting balance; Mod A dynamic standing balance  during ADL tasks & transfers   Endurance/Activity Tolerance    F tolerance with EOB  activity. Pt sat >8 min with no complaints.  Fair  Moderate tasks  Tolerated sitting at EOB 30 minutes  G   tolerance with moderate activity/self care routine   Visual/  Perceptual Impaired:   visual tracking to midline; L  side;  L sided body awareness;  R/L discrimination;intact  depth perception impaired;   spatial awareness   impaired;   L  sided proprioception    Improved awareness of L side, but continued neglect noted, instructed daughter present regarding cross mid-line & re-directing pt to L side of room                       Treatment: OT services provided include: Instruction on precautions prior to bed mobility to facilitate safe transfers and ADLS; sitting balance retraining ex's to improve righting reactions with postural changes during self care tasks; B UE ROM/facilitation ex's to improve body awareness and hand function for participation in ADLs; functional transfer training including postural cues, hand placement/sequencing to improve technique for fall prevention; body awareness and problem solving skills for participation in light ADLs. Instructed pt on SROM exercises L UE with Good results, along with positioning of L UE to prevent edema & joint protection with no edema noted this date. Daughter present & educated through out treatment as well with Good understand. Trace movement of L shoulder noted, no distal movements noted, 1-2 finger subluxation noted. Core strengthening exercises completed along with weight bearing onto forearms with pt able to complete with Min-Mod A. Comments: OK from RN to see patient. Upon arrival, patient supine in bed; agreeable to session. At end of session, patient returned to bed and placed in a chair position. Pillows placed under B UE/LE for jt protection. Call light within reach, all lines and tubes intact. Pt instructed on use of call light for assistance and fall prevention, daughter still present.  Overall, pt presents with decreased ROM/strength, balance, mobility and perceptual deficits  limiting completion of ADLs and safe return home. Pt can benefit from continued skilled OT to increase safety and functional independence. · Pt has made fair+ progress towards set goals. · Continue with current plan of care    Treatment Time In: 2:00pm           Treatment Time Out: 2:45pm       ]  Treatment Charges: Mins Units   Ther Ex  70250     Manual Therapy Parvcandace Rich [id number]     ADL/Home Mgt [id number] 20 1   Neuro Re-ed [id number] 79 2   Orthotic manage/training  57882     Non-Billable Time     Total Timed Treatment 45 3     Caity PATTERSON  [address] [place] [address], [address] [place] [address]

## 2020-06-02 NOTE — CARE COORDINATION
Return call and VM received from [employer], patient's daughter re: transition of care plan. She would like the patient to transition to [facility]. No additional choices provided at this time. Call placed to [employer], liaison with [facility] with referral.  Clinical information provided. She will review the case and let us know if they can accept. Call placed to the patient's daughter [employer] to notify that referral was made. Requested additional choice for [facility], in the event [facility] does not have a bed. Second choice is [facility] in [place]. Await return call with acceptance from [facility].      Karen Elias.  P:  [phone]

## 2020-06-03 NOTE — PROGRESS NOTES
[address]   Internal Medicine Residency / [address]    Attending Physician Statement  I have discussed the case, including pertinent history and exam findings with the resident and the team.  I have seen and examined the patient and the key elements of the encounter have been performed by me. I agree with the assessment, plan and orders as documented by the resident. 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       Donnell Garcia  Internal Medicine Residency Faculty

## 2020-06-03 NOTE — PROGRESS NOTES
OT BEDSIDE TREATMENT NOTE      Date:6/3/2020  Patient Name: Gregg Carrera  MRN: [id number]  : 10/21/1930  Room: [facility]-A      Referring Provider: Vicente Mcdonald MD     Evaluating OT: Ben Albert, OTR/L [id number]        Modified Lugoff Scale   Score     Description  0             No symptoms  1             No significant disability despite symptoms  2             Slight disability; able to look after own affairs  3             Moderate disability; able to ambulate without assist/ requires assist with ADLs  4             Moderate/Severe disability;requires assist to ambulate/assist with ADLs  5             Severe disability;bedridden/incontinent   6               Score:   5     AM-PAC Daily Activity Raw Score:      Recommended Adaptive Equipment: TBA: ADL AE, bathroom DME, AD as indicated. Monitor splinting needs. Diagnosis:  Acute CVA, R MCA     Reason for admission: Pt slid out of chair- L sided flaccid with L facial droop and dysarthria        Pertinent Medical History: Prostate Ca, skin Ca HLD, HTN, hypothyroid, neuropathy     Surgery:  tPA, attempted thrombectomy     Precautions: Falls, L hemiparesis, L inattention,      Home Living: Pt lives with wife  in a 2 story home with 2 step(s) to enter and 1 rail(s); bed/bath on 2nd floor. Bathroom on first floor? Bathroom setup: walk in shower on 2nd floor  Equipment owned: [facility]     Prior Level of Function: IND with ADLs;  IND with IADLs. SPC for ambulation.    Driving: yes  Occupation: retired     Pain Level: pt c/o no pain  this session     Cognition: A&O: 4/4  Follows 1-2 step commands with min cues, easily distracted, pleasant & cooperative, slight slurred speech               Memory: fair-              Comprehension fair-              Problem solving: poor+              Judgement/safety: poor+     Functional Assessment    Initial Eval Status  Date:  Treatment Status  Date: 20 STG=LTG  7-14  days    Feeding NPO SBA  Taking a drink Overall, pt presents with decreased ROM/strength, balance, mobility and perceptual deficits  limiting completion of ADLs and safe return home. Pt can benefit from continued skilled OT to increase safety and functional independence. · Pt has made fair+ progress towards set goals.    · Continue with current plan of care    Treatment Time In: 1450           Treatment Time Out: 9803      ]  Treatment Charges: Mins Units   Ther Ex  96186     Manual Therapy Brayden Villanueva [phone] 11515 10 1   ADL/Home Mgt [id number]     Neuro Re-ed [id number] 51 1   Orthotic manage/training  89145     Non-Billable Time     Total Timed Treatment 25 [id number] [place], [place] [address]

## 2020-06-03 NOTE — PLAN OF CARE
Problem: Falls - Risk of:  Goal: Will remain free from falls  Description: Will remain free from falls  Outcome: Met This Shift  Goal: Absence of physical injury  Description: Absence of physical injury  Outcome: Met This Shift     Problem: Pain:  Description: Pain management should include both nonpharmacologic and pharmacologic interventions.   Goal: Pain level will decrease  Description: Pain level will decrease  Outcome: Met This Shift  Goal: Control of acute pain  Description: Control of acute pain  Outcome: Met This Shift  Goal: Control of chronic pain  Description: Control of chronic pain  Outcome: Met This Shift     Problem: HEMODYNAMIC STATUS  Goal: Patient has stable vital signs and fluid balance  Outcome: Met This Shift     Problem: ACTIVITY INTOLERANCE/IMPAIRED MOBILITY  Goal: Mobility/activity is maintained at optimum level for patient  Outcome: Met This Shift     Problem: COMMUNICATION IMPAIRMENT  Goal: Ability to express needs and understand communication  Outcome: Met This Shift     Problem: Neurological  Goal: Maximum potential motor/sensory/cognitive function  Outcome: Met This Shift

## 2020-06-03 NOTE — PROGRESS NOTES
Physical Therapy  Facility/Department: [facility] NEURO IMCU  Daily Treatment Note  NAME: Dejon Orourke  : 10/21/1930  MRN: [id number]    Date of Service: 6/3/2020    Referring Provider: Poncho Bernardo MD    Evaluating PT: Charles Sinclair, PT, DPT [id number]     Room #:  9312/3466-K  Diagnosis:  Acute CVA, R MCA  Precautions: Falls, L hemiparesis, L inattention, O2  Procedure/Surgery:   tPA, attempted thrombectomy  PMHx/PSHx:  CA, HLD, HTN, Neuropathy  Equipment Needs:  TBD     SUBJECTIVE:     Pt lives with wife in a 2 story home with 2 stairs to enter and 1 rail.  Full flight of steps and 1 rail to bedroom. Pt ambulated with [employer] and was independent PTA.     OBJECTIVE:    Initial Evaluation  Date: 20 Treatment  6/3/20 Short Term/ Long Term   Goals   AM-PAC 6 Clicks      Was pt agreeable to Eval/treatment? Yes Yes     Does pt have pain?  No c/o pain No c/o pain     Bed Mobility  Rolling: NT  Supine to sit: Dep x 2  Sit to supine: Dep x 2  Scooting: Dep  Rolling: Max A  Supine to sit: Max A x 2  Sit to supine: Max A x 2  Scooting: Max A x2 to EOB ModA   Transfers Sit to stand: MaxA x 2  Stand to sit: MaxA x 2  Stand pivot: NT Sit to stand: Dep x2  Stand to sit: Dep x2  Stand pivot: NT ModA with AAD   Ambulation   NT NT >15 feet with ModA with AAD   Stair negotiation: ascended and descended NT   >2 steps with 1 rail ModA   ROM BUE:  Defer to OT note  BLE:  WNL       Strength BUE:  Defer to OT note  RLE:  3+ to 4-/5  L knee ext: 2/5   Increase by 1/3 MMT grade   Balance Sitting EOB:  Dep  Dynamic Standing:  Dep x 2 Sitting EOB: Mod<>Max A  Dynamic Standing:  Dep x2 with HHA Sitting EOB:  Bteh  Dynamic Standing:  ModA with AAD      Pt is A & O x 3- however appears to have intermittent confusion with conversation, difficulty attending to task  Sensation:  pt initially does not feel therapist touching R UE, later stating he does feel light touch  Edema:  Mild edema of B UEs    Therapeutic Exercises:    Seated weight shifting anterior/posterior and laterally x5 minutes  LAQ 10x2 with AAROM for L LE, AROM R LE  Alternating toe taps x20 with AAROM    Patient education  Pt educated on sitting balance, visual scanning and attention to L UE, safety with bed mobility and transfers    Patient response to education:   Pt verbalized understanding Pt demonstrated skill Pt requires further education in this area   yes With assist yes     ASSESSMENT:    Comments:  Pt resting semi-supine upon arrival, agreeable to PT session with OT collaboration. Pt demonstrates strong L lateral listing with removal of TAPS wedges, cues and manual assist provided for improved neutral alignment in semi-supine using R UE to pull self towards midline. Pt requires assistance and max cues for reaching R UE across body to initiate rolling, manual assist for B LE negotiation. Manual assistance provided for B LE and trunk lift/lower to complete supine<>sit, pt demonstrating poor sequencing and requires verbal/tactile cues to release R UE grasp on bed rail. Pt demonstrates strong L lean and L cervical flexion during sitting on EOB, pushing through R UE and unable to redirect with repositioning of R UE. Pt has improved attention to R side this session from previous session. Attempted sit<>stand transfers with no active engagement of either UE or LEs with therapist blocking LEs and providing max assistance to bring trunk towards midline due to strong L anterolateral lean. Pt returned to semi-supine upon completion of session with all needs in reach and bed alarm set. Treatment:  Patient practiced and was instructed in the following treatment:    · Bed mobility: verbal/manual cues for positioning of B LEs and B UEs to increase participation in rolling and supine<>sit, manual assistance to complete transitions, skilled positioning upon return to bed for improved joint protection and decreased risk of breakdown.   · Transfer training: attempted 2x sit<>stand-

## 2020-06-03 NOTE — PROGRESS NOTES
Nutrition Assessment    Type and Reason for Visit: Initial    Nutrition Recommendations: Continue current diet, Start Ensure BID     Nutrition Assessment:  Patient assessed for nutritional risk. Deemed to be at low risk at this time. Will continue to monitor for changes in status. Malnutrition Assessment:  · Malnutrition Status:  At risk for malnutrition    Nutrition Risk Level   Risk Level: Low    Nutrition Diagnosis:   · Problem: Inadequate oral intake  · Etiology: Cognitive or neurological impairment    Signs and symptoms: Intake 0-25%    Nutrition Intervention:  Food and/or Delivery: Continue current diet, Start ONS(Ensure BID)  Nutrition Education/Counseling/Coordination of Care:  Continued Inpatient Monitoring, Education not appropriate at this time, Coordination of Care      Electronically signed by Rosita Cummings MS, RD, LD on 6/3/20 at 3:00 PM EDT    Contact Number: [phone]

## 2020-06-03 NOTE — PROGRESS NOTES
[facility]  Internal Medicine Residency Program  Progress Note - House Team 1    Patient:  Audrey Blakely 80 y.o. male MRN: [id number]     Date of Service: 6/3/2020     CC: MCA Stroke   Overnight events: IM Residency Notified of patient being admitted    Subjective     81 yo  male presented on May 27, 2020 for left sided hemiparesis and was found to have an acute right MCA CVA upon arrival in emergency department. Patient was given tPA and attempted thrombectomy was performed by interventional radiology. Thrombectomy was unsuccessful and patient was admitted to the [facility] for care. Patient was found to have hypotension with current blood pressure medications and was found to have atrial fibirallation with RVR (paroxysmal) while hospitalized. Patient started on lopressor and eliquis for control of afib and treatment of cardioembolic cva. Patient was transferred out of CV and monitored on floor for multiple days receiving PT/OT and speech language therapy. Patient discharged to nursing facility today for further rehab after having 1 indeterminate COVID testing and 1 negative COVID testing. Patient to have CBC with diff on Monday June 8, 2020. Author and IM service was not notified of patient being admitted until today. Objective     Physical Exam:  · Vitals: /78   Pulse 87   Temp 96.9 °F (36.1 °C) (Temporal)   Resp 24   Ht 5' 8\" (1.727 m)   Wt 187 lb (84.8 kg)   SpO2 96%   BMI 28.43 kg/m²     · I & O - 24hr: No intake/output data recorded. · General Appearance: alert, appears older than stated age, cooperative, morbidly obese and slowed mentation  · HEENT:  Head: Normocephalic, without obvious abnormality, scalp contusion  · Eye: Normal external eye, conjunctiva, lids cornea, STEPHANIE. · Nose: Normal external nose, mucus membranes and septum.   · Neck: no adenopathy, no carotid bruit, no JVD, supple, symmetrical, trachea midline and thyroid not enlarged, symmetric, no tenderness/mass/nodules  · Lung: clear to auscultation bilaterally  · Heart: regular rate and rhythm, S1, S2 normal, no murmur, click, rub or gallop  · Abdomen: soft, non-tender; bowel sounds normal; no masses,  no organomegaly  · Extremities:  bruise on left shoulder and left arm, bruise on right arm and left leg from fall   · Musculokeletal: No joint swelling, no muscle tenderness. ROM normal in all joints of extremities. · Neurologic: Mental status: Alert, oriented, thought content appropriate, complete left sided hemiparesis, dysarthria, sensory deficity on left UE and LE, hyporeflexia in LUE and LLE, CNC II-XII grossly intact except for smile droop on right side   Subject  Pertinent Labs & Imaging Studies   shirley  CBC with Differential:    Lab Results   Component Value Date    WBC 9.9 06/03/2020    RBC 2.79 06/03/2020    HGB 8.4 06/03/2020    HCT 26.4 06/03/2020     06/03/2020    MCV 94.6 06/03/2020    MCH 30.1 06/03/2020    MCHC 31.8 06/03/2020    RDW 13.5 06/03/2020    LYMPHOPCT 6.9 06/03/2020    MONOPCT 9.4 06/03/2020    BASOPCT 0.4 06/03/2020    MONOSABS 0.93 06/03/2020    LYMPHSABS 0.68 06/03/2020    EOSABS 0.11 06/03/2020    BASOSABS 0.04 06/03/2020     CMP:    Lab Results   Component Value Date     06/03/2020    K 3.7 06/03/2020     06/03/2020    CO2 21 06/03/2020    BUN 20 06/03/2020    CREATININE 0.8 06/03/2020    GFRAA >60 06/03/2020    LABGLOM >60 06/03/2020    GLUCOSE 112 06/03/2020    PROT 5.4 06/03/2020    LABALBU 2.9 06/03/2020    CALCIUM 7.8 06/03/2020    BILITOT 0.6 06/03/2020    ALKPHOS 70 06/03/2020    AST 34 06/03/2020    ALT 31 06/03/2020     Calcium:    Lab Results   Component Value Date    CALCIUM 7.8 06/03/2020     Magnesium:    Lab Results   Component Value Date    MG 2.2 05/30/2020     Phosphorus:    Lab Results   Component Value Date    PHOS 3.3 05/30/2020       Resident's Assessment and Plan     Assessment  1.  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 Paroxysmal Atrial Fibrillation with RVR   3. HLD  4. HTN  5. Hypothyroidism     Plan  1. Continue PT/OT and speech therapy for treatment of left sided hemiparesis   2. Continue lopressor 25 mg BID and eliquis for treatment of AFIB  3. Continue home medications but switch to lipitor 80 mg nightly and stop other home blood pressure medications   4.  For discharge to rehab facility today     PT/OT evaluation: ordered   DVT prophylaxis/ GI prophylaxis: Eliquis/diet   Disposition: [facility], , PGY-2  Attending physician: Dr. Terry Mcfadden

## 2020-06-17 NOTE — PROGRESS NOTES
Pt here for follow-up for findings on CT scan after stroke. Pt found to have esophageal thickening on CT scan of neck when being evaluated for stroke. Pt denies any issues swallowing, but is poor historian. Past Medical History:   Diagnosis Date    Cancer [facility]) 2008    prostate    Hyperlipidemia     Hypertension     Hypothyroidism     Neuropathy     feet    Skin cancer        History reviewed. No pertinent surgical history. Current Outpatient Medications   Medication Sig Dispense Refill    amLODIPine (NORVASC) 2.5 MG tablet Take 2.5 mg by mouth daily      aspirin 81 MG EC tablet Take 81 mg by mouth daily      atenolol-chlorthalidone (TENORETIC) 100-25 MG per tablet Take 1 tablet by mouth daily      levoFLOXacin (LEVAQUIN) 500 MG tablet Take 500 mg by mouth daily      terazosin (HYTRIN) 10 MG capsule Take 10 mg by mouth nightly      atorvastatin (LIPITOR) 80 MG tablet Take 1 tablet by mouth nightly (Patient taking differently: Take 10 mg by mouth nightly ) 30 tablet 3    gabapentin (NEURONTIN) 300 MG capsule Take 300 mg by mouth daily      levothyroxine (LEVOTHROID) 50 MCG tablet Take 50 mcg by mouth Daily       No current facility-administered medications for this visit. Allergies   Allergen Reactions    Other      tetnus shot a long time ago       History reviewed. No pertinent family history.     Social History     Socioeconomic History    Marital status:      Spouse name: Not on file    Number of children: Not on file    Years of education: Not on file    Highest education level: Not on file   Occupational History    Not on file   Social Needs    Financial resource strain: Not on file    Food insecurity     Worry: Not on file     Inability: Not on file    Transportation needs     Medical: Not on file     Non-medical: Not on file   Tobacco Use    Smoking status: Former Smoker    Smokeless tobacco: Never Used   Substance and Sexual Activity    Alcohol use: Not